# Patient Record
Sex: MALE | Race: WHITE | NOT HISPANIC OR LATINO
[De-identification: names, ages, dates, MRNs, and addresses within clinical notes are randomized per-mention and may not be internally consistent; named-entity substitution may affect disease eponyms.]

---

## 2017-05-18 ENCOUNTER — APPOINTMENT (OUTPATIENT)
Dept: UROLOGY | Facility: CLINIC | Age: 67
End: 2017-05-18

## 2017-11-02 ENCOUNTER — APPOINTMENT (OUTPATIENT)
Dept: UROLOGY | Facility: CLINIC | Age: 67
End: 2017-11-02

## 2017-11-09 ENCOUNTER — APPOINTMENT (OUTPATIENT)
Dept: UROLOGY | Facility: CLINIC | Age: 67
End: 2017-11-09
Payer: MEDICARE

## 2017-11-09 PROCEDURE — 99214 OFFICE O/P EST MOD 30 MIN: CPT

## 2017-11-10 LAB — PSA SERPL-MCNC: 3.35 NG/ML

## 2018-05-31 ENCOUNTER — APPOINTMENT (OUTPATIENT)
Dept: UROLOGY | Facility: CLINIC | Age: 68
End: 2018-05-31
Payer: MEDICARE

## 2018-05-31 VITALS
WEIGHT: 215 LBS | DIASTOLIC BLOOD PRESSURE: 80 MMHG | TEMPERATURE: 98.2 F | HEIGHT: 70 IN | OXYGEN SATURATION: 98 % | SYSTOLIC BLOOD PRESSURE: 130 MMHG | RESPIRATION RATE: 16 BRPM | HEART RATE: 84 BPM | BODY MASS INDEX: 30.78 KG/M2

## 2018-05-31 DIAGNOSIS — Z86.79 PERSONAL HISTORY OF OTHER DISEASES OF THE CIRCULATORY SYSTEM: ICD-10-CM

## 2018-05-31 PROCEDURE — 99214 OFFICE O/P EST MOD 30 MIN: CPT

## 2018-08-06 ENCOUNTER — FORM ENCOUNTER (OUTPATIENT)
Age: 68
End: 2018-08-06

## 2018-08-07 ENCOUNTER — OUTPATIENT (OUTPATIENT)
Dept: OUTPATIENT SERVICES | Facility: HOSPITAL | Age: 68
LOS: 1 days | End: 2018-08-07
Payer: MEDICARE

## 2018-08-07 ENCOUNTER — APPOINTMENT (OUTPATIENT)
Dept: MRI IMAGING | Facility: IMAGING CENTER | Age: 68
End: 2018-08-07
Payer: MEDICARE

## 2018-08-07 DIAGNOSIS — C61 MALIGNANT NEOPLASM OF PROSTATE: ICD-10-CM

## 2018-08-07 PROCEDURE — 82565 ASSAY OF CREATININE: CPT

## 2018-08-07 PROCEDURE — 72197 MRI PELVIS W/O & W/DYE: CPT

## 2018-08-07 PROCEDURE — 72197 MRI PELVIS W/O & W/DYE: CPT | Mod: 26

## 2018-08-07 PROCEDURE — A9585: CPT

## 2018-08-15 ENCOUNTER — MESSAGE (OUTPATIENT)
Age: 68
End: 2018-08-15

## 2018-10-29 ENCOUNTER — APPOINTMENT (OUTPATIENT)
Dept: UROLOGY | Facility: CLINIC | Age: 68
End: 2018-10-29
Payer: MEDICARE

## 2018-10-29 VITALS
OXYGEN SATURATION: 96 % | HEART RATE: 78 BPM | DIASTOLIC BLOOD PRESSURE: 92 MMHG | RESPIRATION RATE: 15 BRPM | HEIGHT: 70 IN | BODY MASS INDEX: 30.78 KG/M2 | TEMPERATURE: 98 F | WEIGHT: 215 LBS | SYSTOLIC BLOOD PRESSURE: 146 MMHG

## 2018-10-29 DIAGNOSIS — Z87.19 PERSONAL HISTORY OF OTHER DISEASES OF THE DIGESTIVE SYSTEM: ICD-10-CM

## 2018-10-29 PROCEDURE — 99214 OFFICE O/P EST MOD 30 MIN: CPT

## 2018-10-30 LAB — PSA SERPL-MCNC: 2 NG/ML

## 2018-12-20 ENCOUNTER — MEDICATION RENEWAL (OUTPATIENT)
Age: 68
End: 2018-12-20

## 2019-06-10 ENCOUNTER — APPOINTMENT (OUTPATIENT)
Dept: UROLOGY | Facility: CLINIC | Age: 69
End: 2019-06-10
Payer: MEDICARE

## 2019-06-10 VITALS
HEART RATE: 75 BPM | SYSTOLIC BLOOD PRESSURE: 140 MMHG | OXYGEN SATURATION: 98 % | HEIGHT: 70 IN | BODY MASS INDEX: 30.78 KG/M2 | WEIGHT: 215 LBS | DIASTOLIC BLOOD PRESSURE: 94 MMHG

## 2019-06-10 PROCEDURE — 99213 OFFICE O/P EST LOW 20 MIN: CPT

## 2019-06-10 NOTE — PHYSICAL EXAM
[General Appearance - Well Developed] : well developed [General Appearance - Well Nourished] : well nourished [Normal Appearance] : normal appearance [Well Groomed] : well groomed [General Appearance - In No Acute Distress] : no acute distress [Abdomen Soft] : soft [Abdomen Tenderness] : non-tender [Costovertebral Angle Tenderness] : no ~M costovertebral angle tenderness [Urethral Meatus] : meatus normal [Penis Abnormality] : normal circumcised penis [Urinary Bladder Findings] : the bladder was normal on palpation [Scrotum] : the scrotum was normal [Testes Tenderness] : no tenderness of the testes [Testes Mass (___cm)] : there were no testicular masses [Prostate Tenderness] : the prostate was not tender [No Prostate Nodules] : no prostate nodules [Prostate Enlarged] : was enlarged [FreeTextEntry1] : KIEL done on 10/2018 [Respiration, Rhythm And Depth] : normal respiratory rhythm and effort [] : no respiratory distress [Exaggerated Use Of Accessory Muscles For Inspiration] : no accessory muscle use

## 2019-06-10 NOTE — HISTORY OF PRESENT ILLNESS
[FreeTextEntry1] : He is a 68-year-old man who is seen today in follow-up for prostate cancer on observation and urinary symptoms. He is on finasteride, Flomax and daily Cialis. He seems to be satisfied with urination. Nocturia is 2 times. Cialis is working very well for erectile dysfunction. In October 2018, PSA level was 2. Residual urine was less than 30 cc today again. There is no hematuria, dysuria or flank pain. \par \par Previous notes: Pelvic MRI in August 2018 shows prostate volume 116 cc and no suspicious lesions. In August 2016, he was diagnosed with small volume [place] 6 prostate cancer, PSA of 5.6 and was placed on observation. MRI prior to the biopsy showed prostate volume 103 cc and a small area  with suspicion score of 3/5. In April 2016, CT scan showed small bilateral adrenal adenomas. He has undergone cystoscopy in the past.

## 2019-06-10 NOTE — ASSESSMENT
[FreeTextEntry1] : He can use Cialis up to 20 mg as needed. Continue Flomax and finasteride. PSA level will be checked. He should consider repeat prostate biopsy in the future. He he will think about it. Risks of prostate cancer on observation and also the effect of the finasteride on PSA level were discussed.\par \par Jet Short MD, FACS\par [facility]\par  of Urology\par \par [address], [address]\[place] [address]\par \par [address], [address]\[place] [address]\par \par Tel: [phone]\par Fax: [phone]

## 2019-06-11 LAB — PSA SERPL-MCNC: 1.68 NG/ML

## 2019-09-18 ENCOUNTER — APPOINTMENT (OUTPATIENT)
Dept: UROLOGY | Facility: CLINIC | Age: 69
End: 2019-09-18
Payer: MEDICARE

## 2019-09-18 VITALS
DIASTOLIC BLOOD PRESSURE: 80 MMHG | BODY MASS INDEX: 30.06 KG/M2 | SYSTOLIC BLOOD PRESSURE: 124 MMHG | OXYGEN SATURATION: 97 % | HEART RATE: 54 BPM | HEIGHT: 70 IN | RESPIRATION RATE: 14 BRPM | WEIGHT: 210 LBS

## 2019-09-18 PROCEDURE — 99213 OFFICE O/P EST LOW 20 MIN: CPT

## 2019-09-18 NOTE — ASSESSMENT
[FreeTextEntry1] : Continue Flomax finasteride. Urination is well-controlled. He could try Viagra again up to 100 mg instead of Cialis. When he returns from [place], he may consider repeat prostate biopsy or MRI, in 2020.

## 2019-09-18 NOTE — HISTORY OF PRESENT ILLNESS
[FreeTextEntry1] : He is a 69-year-old man who is seen today in follow-up for prostate cancer on observation, ED and urinary symptoms. He is on finasteride, Flomax and daily Cialis.  Cialis is not significantly helping with erections even up to 20 mg even though it was helpful in the past. He responded better to Viagra many years ago. Nocturia is 2 times. Urinary symptoms seem to be controlled. The residual urine today was minimal. PSA level was 1.68 in June 2019. He has no hematuria, dysuria or flank pain. \par \par Previous notes: Pelvic MRI in August 2018 shows prostate volume 116 cc and no suspicious lesions. In August 2016, he was diagnosed with small volume [place] 6 prostate cancer, PSA of 5.6 and was placed on observation. MRI prior to the biopsy showed prostate volume 103 cc and a small area  with suspicion score of 3/5. In April 2016, CT scan showed small bilateral adrenal adenomas. He has undergone cystoscopy in the past.

## 2019-09-18 NOTE — PHYSICAL EXAM
[Normal Appearance] : normal appearance [General Appearance - Well Developed] : well developed [General Appearance - Well Nourished] : well nourished [General Appearance - In No Acute Distress] : no acute distress [Well Groomed] : well groomed [Abdomen Soft] : soft [Abdomen Tenderness] : non-tender [Urethral Meatus] : meatus normal [Costovertebral Angle Tenderness] : no ~M costovertebral angle tenderness [Penis Abnormality] : normal circumcised penis [Scrotum] : the scrotum was normal [Urinary Bladder Findings] : the bladder was normal on palpation [Testes Tenderness] : no tenderness of the testes [Prostate Tenderness] : the prostate was not tender [Testes Mass (___cm)] : there were no testicular masses [No Prostate Nodules] : no prostate nodules [Prostate Enlarged] : was enlarged [FreeTextEntry1] : KIEL done on 10/2018 [] : no respiratory distress [Oriented To Time, Place, And Person] : oriented to person, place, and time [Mood] : the mood was normal [Affect] : the affect was normal [Not Anxious] : not anxious

## 2019-12-08 LAB — PSA SERPL-MCNC: 2.13 NG/ML

## 2020-02-14 ENCOUNTER — RX RENEWAL (OUTPATIENT)
Age: 70
End: 2020-02-14

## 2020-07-29 ENCOUNTER — APPOINTMENT (OUTPATIENT)
Dept: UROLOGY | Facility: CLINIC | Age: 70
End: 2020-07-29
Payer: MEDICARE

## 2020-07-29 VITALS
OXYGEN SATURATION: 95 % | HEIGHT: 70 IN | BODY MASS INDEX: 30.78 KG/M2 | DIASTOLIC BLOOD PRESSURE: 98 MMHG | SYSTOLIC BLOOD PRESSURE: 150 MMHG | HEART RATE: 75 BPM | TEMPERATURE: 97.6 F | WEIGHT: 215 LBS | RESPIRATION RATE: 14 BRPM

## 2020-07-29 PROCEDURE — 99214 OFFICE O/P EST MOD 30 MIN: CPT

## 2020-07-29 NOTE — HISTORY OF PRESENT ILLNESS
[FreeTextEntry1] : He is a 70-year-old man who is seen today in follow-up for prostate cancer on observation, ED and urinary symptoms. Recently he had painless gross hematuria which has slowly resolved. There was no flank pain or fever. He is a nonsmoker. He does not have previous history of hematuria. PSA level was 2.13 in December 2019. He is responding to Viagra 100 mg. There has been no significant side effects. Also he is on Flomax and Proscar. He has tried Cialis in the past without success. Nocturia is usually 2 times. Residual urine was minimal in the past.\par \par Previous notes: Pelvic MRI in August 2018 shows prostate volume 116 cc and no suspicious lesions. In August 2016, he was diagnosed with small volume [place] 6 prostate cancer, PSA of 5.6 and was placed on observation. MRI prior to the biopsy showed prostate volume 103 cc and a small area  with suspicion score of 3/5. In April 2016, CT scan showed small bilateral adrenal adenomas. He has undergone cystoscopy in the past.

## 2020-07-29 NOTE — ASSESSMENT
[FreeTextEntry1] : The protocol for prostate cancer on observation was discussed again. PSA level will be checked today. He will continue with Flomax and Proscar for his urinary symptoms. Continue Viagra and separate from Flomax by 4 hours. We discussed the difference between microscopic and gross hematuria. Potential benign and malignant urological conditions that can cause hematuria were reviewed. Workup including renal imaging with ultrasonography or CT scan, urine studies and cystoscopy were reviewed. Risks of cystoscopy were discussed. Patient was made aware that despite adequate workup, the cause for hematuria may not be discovered and continued followup is recommended. Patient's questions were answered. He will undergo CT scan and cystoscopy.

## 2020-07-29 NOTE — PHYSICAL EXAM
[General Appearance - Well Developed] : well developed [General Appearance - Well Nourished] : well nourished [Normal Appearance] : normal appearance [Well Groomed] : well groomed [General Appearance - In No Acute Distress] : no acute distress [Abdomen Soft] : soft [Abdomen Tenderness] : non-tender [Costovertebral Angle Tenderness] : no ~M costovertebral angle tenderness [Urethral Meatus] : meatus normal [Penis Abnormality] : normal circumcised penis [Urinary Bladder Findings] : the bladder was normal on palpation [Scrotum] : the scrotum was normal [Testes Tenderness] : no tenderness of the testes [Testes Mass (___cm)] : there were no testicular masses [Prostate Tenderness] : the prostate was not tender [No Prostate Nodules] : no prostate nodules [Prostate Enlarged] : was enlarged [FreeTextEntry1] : KIEL done on 7/2020 [] : no respiratory distress [Respiration, Rhythm And Depth] : normal respiratory rhythm and effort [Exaggerated Use Of Accessory Muscles For Inspiration] : no accessory muscle use [Oriented To Time, Place, And Person] : oriented to person, place, and time [Affect] : the affect was normal [Mood] : the mood was normal [Not Anxious] : not anxious [Inguinal Lymph Nodes Enlarged Bilaterally] : inguinal

## 2020-07-30 LAB
APPEARANCE: ABNORMAL
BACTERIA: NEGATIVE
BILIRUBIN URINE: ABNORMAL
BLOOD URINE: ABNORMAL
COLOR: ABNORMAL
GLUCOSE QUALITATIVE U: NEGATIVE
HYALINE CASTS: 2 /LPF
KETONES URINE: NEGATIVE
LEUKOCYTE ESTERASE URINE: NEGATIVE
MICROSCOPIC-UA: NORMAL
NITRITE URINE: NEGATIVE
PH URINE: 6.5
PROTEIN URINE: ABNORMAL
PSA SERPL-MCNC: 2.48 NG/ML
RED BLOOD CELLS URINE: >720 /HPF
SPECIFIC GRAVITY URINE: 1.02
SQUAMOUS EPITHELIAL CELLS: 1 /HPF
URINE CYTOLOGY: NORMAL
UROBILINOGEN URINE: NORMAL
WHITE BLOOD CELLS URINE: 11 /HPF

## 2020-07-31 LAB — BACTERIA UR CULT: NORMAL

## 2020-08-04 ENCOUNTER — APPOINTMENT (OUTPATIENT)
Dept: CT IMAGING | Facility: CLINIC | Age: 70
End: 2020-08-04
Payer: MEDICARE

## 2020-08-04 ENCOUNTER — OUTPATIENT (OUTPATIENT)
Dept: OUTPATIENT SERVICES | Facility: HOSPITAL | Age: 70
LOS: 1 days | End: 2020-08-04
Payer: MEDICARE

## 2020-08-04 ENCOUNTER — RESULT REVIEW (OUTPATIENT)
Age: 70
End: 2020-08-04

## 2020-08-04 DIAGNOSIS — R31.0 GROSS HEMATURIA: ICD-10-CM

## 2020-08-04 PROCEDURE — 74178 CT ABD&PLV WO CNTR FLWD CNTR: CPT

## 2020-08-04 PROCEDURE — 74178 CT ABD&PLV WO CNTR FLWD CNTR: CPT | Mod: 26

## 2020-08-04 PROCEDURE — 82565 ASSAY OF CREATININE: CPT

## 2020-08-12 ENCOUNTER — APPOINTMENT (OUTPATIENT)
Dept: UROLOGY | Facility: CLINIC | Age: 70
End: 2020-08-12

## 2020-08-24 ENCOUNTER — APPOINTMENT (OUTPATIENT)
Dept: UROLOGY | Facility: CLINIC | Age: 70
End: 2020-08-24
Payer: MEDICARE

## 2020-08-24 VITALS
TEMPERATURE: 98 F | HEIGHT: 70 IN | DIASTOLIC BLOOD PRESSURE: 92 MMHG | RESPIRATION RATE: 13 BRPM | BODY MASS INDEX: 30.78 KG/M2 | WEIGHT: 215 LBS | OXYGEN SATURATION: 93 % | SYSTOLIC BLOOD PRESSURE: 148 MMHG | HEART RATE: 79 BPM

## 2020-08-24 PROCEDURE — 52000 CYSTOURETHROSCOPY: CPT

## 2020-08-24 RX ORDER — MUPIROCIN 2 G/100G
2 CREAM TOPICAL
Qty: 15 | Refills: 0 | Status: ACTIVE | COMMUNITY
Start: 2020-08-04

## 2020-08-24 RX ORDER — BETAMETHASONE DIPROPIONATE 0.5 MG/G
0.05 OINTMENT TOPICAL
Qty: 45 | Refills: 0 | Status: ACTIVE | COMMUNITY
Start: 2020-06-09

## 2020-08-24 RX ORDER — METOPROLOL TARTRATE 50 MG/1
50 TABLET, FILM COATED ORAL
Qty: 180 | Refills: 0 | Status: ACTIVE | COMMUNITY
Start: 2020-06-18

## 2020-12-03 ENCOUNTER — APPOINTMENT (OUTPATIENT)
Dept: UROLOGY | Facility: CLINIC | Age: 70
End: 2020-12-03
Payer: MEDICARE

## 2020-12-03 VITALS
RESPIRATION RATE: 14 BRPM | TEMPERATURE: 97.8 F | WEIGHT: 220 LBS | SYSTOLIC BLOOD PRESSURE: 162 MMHG | HEIGHT: 70 IN | DIASTOLIC BLOOD PRESSURE: 98 MMHG | HEART RATE: 77 BPM | OXYGEN SATURATION: 94 % | BODY MASS INDEX: 31.5 KG/M2

## 2020-12-03 DIAGNOSIS — Z87.898 PERSONAL HISTORY OF OTHER SPECIFIED CONDITIONS: ICD-10-CM

## 2020-12-03 PROCEDURE — 99214 OFFICE O/P EST MOD 30 MIN: CPT

## 2020-12-03 PROCEDURE — 99072 ADDL SUPL MATRL&STAF TM PHE: CPT

## 2020-12-03 NOTE — HISTORY OF PRESENT ILLNESS
[FreeTextEntry1] : He is a 70-year-old man who is seen today in follow-up for prostate cancer on observation, ED and urinary symptoms.  He underwent hematuria work-up with cystoscopy, CT scan and urine studies in August 2020 which were normal.  There has been no further hematuria.  Now he is drinking tea before bedtime and therefore nocturia is 4 times.  He is on finasteride and Flomax.  Residual urine today was minimal.  He is not responding very well to Viagra 100 mg or Cialis.  PSA level was 2.48 in July 2020. He is a nonsmoker. \par \par Previous notes: Pelvic MRI in August 2018 shows prostate volume 116 cc and no suspicious lesions. In August 2016, he was diagnosed with small volume [place] 6 prostate cancer, PSA of 5.6 and was placed on observation. MRI prior to the biopsy showed prostate volume 103 cc and a small area  with suspicion score of 3/5. In April 2016, CT scan showed small bilateral adrenal adenomas. He has undergone cystoscopy in the past.

## 2020-12-03 NOTE — PHYSICAL EXAM
[General Appearance - Well Developed] : well developed [General Appearance - Well Nourished] : well nourished [Normal Appearance] : normal appearance [Well Groomed] : well groomed [General Appearance - In No Acute Distress] : no acute distress [Abdomen Soft] : soft [Abdomen Tenderness] : non-tender [Costovertebral Angle Tenderness] : no ~M costovertebral angle tenderness [Urethral Meatus] : meatus normal [Penis Abnormality] : normal circumcised penis [Urinary Bladder Findings] : the bladder was normal on palpation [Scrotum] : the scrotum was normal [Testes Tenderness] : no tenderness of the testes [Testes Mass (___cm)] : there were no testicular masses [Prostate Tenderness] : the prostate was not tender [No Prostate Nodules] : no prostate nodules [Prostate Enlarged] : was enlarged [FreeTextEntry1] : KIEL done on 7/2020. [] : no respiratory distress [Respiration, Rhythm And Depth] : normal respiratory rhythm and effort [Exaggerated Use Of Accessory Muscles For Inspiration] : no accessory muscle use [Oriented To Time, Place, And Person] : oriented to person, place, and time [Affect] : the affect was normal [Mood] : the mood was normal [Not Anxious] : not anxious [Inguinal Lymph Nodes Enlarged Bilaterally] : inguinal

## 2020-12-03 NOTE — ASSESSMENT
[FreeTextEntry1] : He should limit fluid intake before bedtime.  Previously, nocturia was better.  Continue Flomax and Proscar.  PSA level has decreased about 50% as expected with Proscar.  Hematuria work-up recently was unremarkable.  He has not responded to Viagra or Cialis.  Initially he was responding to them.  Another option would be to try Stendra 200 mg which was prescribed for him.  Separate Stendra from Flomax by about 4 hours.  Side effects reviewed.  He will follow up in a few months when he returns from [place].  Also the protocol for prostate cancer on observation was discussed.  Since PSA level has decreased, he has continued observation for now without having a repeat biopsy.\par \par Jet Short MD, FACS\par [facility]\par  of Urology\par \par [address], [address]\[place] [address]\par \par [address], [address]\[place] [address]\par \par Tel: [phone]\par Fax: [phone]

## 2021-05-20 ENCOUNTER — APPOINTMENT (OUTPATIENT)
Dept: UROLOGY | Facility: CLINIC | Age: 71
End: 2021-05-20
Payer: MEDICARE

## 2021-05-20 VITALS
HEART RATE: 73 BPM | TEMPERATURE: 97.4 F | OXYGEN SATURATION: 94 % | RESPIRATION RATE: 16 BRPM | WEIGHT: 220 LBS | DIASTOLIC BLOOD PRESSURE: 104 MMHG | HEIGHT: 70 IN | SYSTOLIC BLOOD PRESSURE: 145 MMHG | BODY MASS INDEX: 31.5 KG/M2

## 2021-05-20 DIAGNOSIS — Z87.448 PERSONAL HISTORY OF OTHER DISEASES OF URINARY SYSTEM: ICD-10-CM

## 2021-05-20 PROCEDURE — 99214 OFFICE O/P EST MOD 30 MIN: CPT

## 2021-05-20 PROCEDURE — 99072 ADDL SUPL MATRL&STAF TM PHE: CPT

## 2021-05-20 RX ORDER — AVANAFIL 200 MG/1
200 TABLET ORAL
Qty: 6 | Refills: 6 | Status: DISCONTINUED | COMMUNITY
Start: 2020-12-03 | End: 2021-05-20

## 2021-05-20 NOTE — PHYSICAL EXAM
[General Appearance - Well Developed] : well developed [General Appearance - Well Nourished] : well nourished [Normal Appearance] : normal appearance [Well Groomed] : well groomed [General Appearance - In No Acute Distress] : no acute distress [Abdomen Soft] : soft [Abdomen Tenderness] : non-tender [Costovertebral Angle Tenderness] : no ~M costovertebral angle tenderness [Urethral Meatus] : meatus normal [Penis Abnormality] : normal circumcised penis [Urinary Bladder Findings] : the bladder was normal on palpation [Scrotum] : the scrotum was normal [Testes Tenderness] : no tenderness of the testes [Testes Mass (___cm)] : there were no testicular masses [Prostate Tenderness] : the prostate was not tender [No Prostate Nodules] : no prostate nodules [Prostate Enlarged] : was enlarged [] : no respiratory distress [Respiration, Rhythm And Depth] : normal respiratory rhythm and effort [Exaggerated Use Of Accessory Muscles For Inspiration] : no accessory muscle use [Oriented To Time, Place, And Person] : oriented to person, place, and time [Affect] : the affect was normal [Mood] : the mood was normal [Not Anxious] : not anxious [FreeTextEntry1] : KIEL done on 5/2021.

## 2021-05-20 NOTE — ASSESSMENT
[FreeTextEntry1] : From urinary standpoint, he seems to be doing well on the current medication therapy.  Residual urine volume is minimal.  PSA level will be repeated.  He may decide on considering an MRI when he follows up in 6 months.  So far, he has held off on repeat biopsy due to low PSA level.  He is aware that PSA level decreased due to finasteride.  Also we discussed further management of erectile dysfunction with intracavernosal injection therapy such as Trimix.  He was instructed in the office on how to perform it.  Risk of priapism was discussed.  If he decides on using it, he will call me back to order it for him.

## 2021-05-20 NOTE — HISTORY OF PRESENT ILLNESS
[FreeTextEntry1] : He is a 70-year-old man who is seen today in follow-up for prostate cancer on observation, ED and urinary symptoms.  Nocturia is 2 times.  Urinary flow is normal.  He had worsening nocturia in the past when he was drinking tea before bedtime.  He is on tamsulosin and finasteride.  He did not respond very well to Viagra, Cialis or Stendra.  He is on Coumadin.  He is due for PSA level.  He underwent hematuria work-up with cystoscopy, CT scan and urine studies in August 2020 which were normal.  Residual urine was minimal. PSA level was 2.48 in July 2020. He is a nonsmoker. \par \par Previous notes: Pelvic MRI in August 2018 shows prostate volume 116 cc and no suspicious lesions. In August 2016, he was diagnosed with small volume [place] 6 prostate cancer, PSA of 5.6 and was placed on observation. MRI prior to the biopsy showed prostate volume 103 cc and a small area  with suspicion score of 3/5. In April 2016, CT scan showed small bilateral adrenal adenomas.

## 2021-05-21 ENCOUNTER — NON-APPOINTMENT (OUTPATIENT)
Age: 71
End: 2021-05-21

## 2021-05-21 LAB — PSA SERPL-MCNC: 2.31 NG/ML

## 2021-07-28 ENCOUNTER — RX RENEWAL (OUTPATIENT)
Age: 71
End: 2021-07-28

## 2021-10-19 ENCOUNTER — RX RENEWAL (OUTPATIENT)
Age: 71
End: 2021-10-19

## 2021-11-24 ENCOUNTER — APPOINTMENT (OUTPATIENT)
Dept: UROLOGY | Facility: CLINIC | Age: 71
End: 2021-11-24
Payer: MEDICARE

## 2021-11-24 VITALS
HEART RATE: 77 BPM | OXYGEN SATURATION: 94 % | TEMPERATURE: 98.1 F | HEIGHT: 70 IN | WEIGHT: 225 LBS | RESPIRATION RATE: 14 BRPM | SYSTOLIC BLOOD PRESSURE: 145 MMHG | BODY MASS INDEX: 32.21 KG/M2 | DIASTOLIC BLOOD PRESSURE: 103 MMHG

## 2021-11-24 PROCEDURE — 99213 OFFICE O/P EST LOW 20 MIN: CPT

## 2021-11-24 NOTE — HISTORY OF PRESENT ILLNESS
[FreeTextEntry1] : He is a 71 year-old man who is seen today in follow-up for prostate cancer on observation, ED and urinary symptoms.  Nocturia is 3-4 times but he drinks tea late at night.  Urinary flow is normal.  Residual urine today was 40 cc.  PSA level was 2.3 in May 2021.  He is on tamsulosin and finasteride.  He is considering MRI for next visit for evaluation of the prostate.  He did not respond very well to Viagra, Cialis or Stendra.  He is on Coumadin.  \par \par He underwent hematuria work-up with cystoscopy, CT scan and urine studies in August 2020 which were normal. He is a nonsmoker. \par \par Previous notes: Pelvic MRI in August 2018 shows prostate volume 116 cc and no suspicious lesions. In August 2016, he was diagnosed with small volume [place] 6 prostate cancer, PSA of 5.6 and was placed on observation. MRI prior to the biopsy showed prostate volume 103 cc and a small area  with suspicion score of 3/5. In April 2016, CT scan showed small bilateral adrenal adenomas.

## 2021-11-24 NOTE — ASSESSMENT
[FreeTextEntry1] : Prescriptions were refilled.  Side effects reviewed.  PSA level will be repeated today as well.  He may consider MRI of the prostate after the next visit.  He should limit intake of tea before bedtime.  Also other treatment options for erectile dysfunction have been discussed in the past.  He will follow up in 6 months.

## 2021-11-26 LAB — PSA SERPL-MCNC: 2.54 NG/ML

## 2022-05-12 ENCOUNTER — APPOINTMENT (OUTPATIENT)
Dept: UROLOGY | Facility: CLINIC | Age: 72
End: 2022-05-12
Payer: MEDICARE

## 2022-05-12 PROCEDURE — 99213 OFFICE O/P EST LOW 20 MIN: CPT

## 2022-05-12 NOTE — PHYSICAL EXAM
[Urethral Meatus] : meatus normal [Penis Abnormality] : normal circumcised penis [Urinary Bladder Findings] : the bladder was normal on palpation [Scrotum] : the scrotum was normal [Testes Tenderness] : no tenderness of the testes [Testes Mass (___cm)] : there were no testicular masses [Prostate Tenderness] : the prostate was not tender [No Prostate Nodules] : no prostate nodules [Prostate Enlarged] : was enlarged [General Appearance - Well Developed] : well developed [General Appearance - Well Nourished] : well nourished [Normal Appearance] : normal appearance [Well Groomed] : well groomed [General Appearance - In No Acute Distress] : no acute distress [Abdomen Soft] : soft [Abdomen Tenderness] : non-tender [Costovertebral Angle Tenderness] : no ~M costovertebral angle tenderness [] : no respiratory distress [Respiration, Rhythm And Depth] : normal respiratory rhythm and effort [Exaggerated Use Of Accessory Muscles For Inspiration] : no accessory muscle use

## 2022-05-12 NOTE — HISTORY OF PRESENT ILLNESS
[FreeTextEntry1] : He is a 71 year-old man who is seen today in follow-up for prostate cancer on observation, ED and urinary symptoms.  He still has nocturia 3-4 times but he does not limit fluids and tea drinking before bedtime.  Residual urine volume today was minimal.  PSA level was stable which was 2.5 in November 2021.  Continued observation given stability of PSA level.  He is on finasteride and tamsulosin.  He did not respond very well to Viagra, Cialis or Stendra.  He is on Coumadin.  \par \par He underwent hematuria work-up with cystoscopy, CT scan and urine studies in August 2020 which were normal. He is a nonsmoker. \par \par Previous notes: Pelvic MRI in August 2018 shows prostate volume 116 cc and no suspicious lesions. In August 2016, he was diagnosed with small volume [place] 6 prostate cancer, PSA of 5.6 and was placed on observation. MRI prior to the biopsy showed prostate volume 103 cc and a small area  with suspicion score of 3/5. In April 2016, CT scan showed small bilateral adrenal adenomas.

## 2022-05-12 NOTE — ASSESSMENT
[FreeTextEntry1] : He is emptying his bladder well.  He will continue with tamsulosin and finasteride.  PSA level will be repeated.  He has continued observation for now.  He may consider repeat MRI later this year.  Try to limit fluids before bedtime.  He will follow-up in 6 months pending results.

## 2022-05-13 LAB — PSA SERPL-MCNC: 3.79 NG/ML

## 2022-06-16 ENCOUNTER — OUTPATIENT (OUTPATIENT)
Dept: OUTPATIENT SERVICES | Facility: HOSPITAL | Age: 72
LOS: 1 days | End: 2022-06-16
Payer: MEDICARE

## 2022-06-16 ENCOUNTER — APPOINTMENT (OUTPATIENT)
Dept: MRI IMAGING | Facility: CLINIC | Age: 72
End: 2022-06-16
Payer: MEDICARE

## 2022-06-16 ENCOUNTER — RESULT REVIEW (OUTPATIENT)
Age: 72
End: 2022-06-16

## 2022-06-16 DIAGNOSIS — C61 MALIGNANT NEOPLASM OF PROSTATE: ICD-10-CM

## 2022-06-16 PROCEDURE — 76498 UNLISTED MR PROCEDURE: CPT

## 2022-06-16 PROCEDURE — 72197 MRI PELVIS W/O & W/DYE: CPT

## 2022-06-16 PROCEDURE — 76498P: CUSTOM | Mod: 26

## 2022-06-16 PROCEDURE — 72197 MRI PELVIS W/O & W/DYE: CPT | Mod: 26

## 2022-06-16 PROCEDURE — A9585: CPT

## 2022-08-15 ENCOUNTER — RX RENEWAL (OUTPATIENT)
Age: 72
End: 2022-08-15

## 2022-08-31 ENCOUNTER — APPOINTMENT (OUTPATIENT)
Dept: ORTHOPEDIC SURGERY | Facility: CLINIC | Age: 72
End: 2022-08-31

## 2022-08-31 VITALS — BODY MASS INDEX: 32.93 KG/M2 | WEIGHT: 230 LBS | HEIGHT: 70 IN

## 2022-08-31 PROCEDURE — 73564 X-RAY EXAM KNEE 4 OR MORE: CPT | Mod: RT

## 2022-08-31 PROCEDURE — 99203 OFFICE O/P NEW LOW 30 MIN: CPT

## 2022-08-31 RX ORDER — MELOXICAM 7.5 MG/1
7.5 TABLET ORAL TWICE DAILY
Qty: 30 | Refills: 0 | Status: ACTIVE | COMMUNITY
Start: 2022-08-31 | End: 1900-01-01

## 2022-08-31 NOTE — PHYSICAL EXAM
[de-identified] : General Exam\par \par Well developed, well nourished\par No apparent distress\par Oriented to person, place, and time\par Mood: Normal\par Affect: Normal\par Balance and coordination: Normal\par Gait: Normal\par \par right knee exam\par \par Skin: Clean, dry, intact\par Inspection: No obvious malalignment, no masses, no swelling, + effusion.\par Tenderness: + MJLT. No LJLT. No tenderness over the medial and lateral patella facets. No ttp medial/lateral epicondyle, patella tendon, tibial tubercle, pes anserinus, or proximal fibula.\par ROM: 0 to 130° no pain with deep flexion in both knees\par Stability: Stable to varus, valgus, lachman testing. Negative anterior/posterior drawer.\par Additional tests: Negative McMurrays test, Negative patellar grind test. \par Strength: 5/5 Q/H/TA/GS/EHL, no atrophy\par Neuro: In tact to light touch throughout in dp/sp/tib/lexi/saph nerve districutions, DTR's normal\par Pulses: 2+ DP/PT pulses.\par  [de-identified] : \par The following radiographs were ordered and read by me during this patients visit. I reviewed each radiograph in detail with the patient and discussed the findings as highlighted below. \par \par 4 views right knee were obtained today.  There is moderate to severe patellofemoral arthrosis joint space narrowing osteophyte sclerosis

## 2022-08-31 NOTE — HISTORY OF PRESENT ILLNESS
[de-identified] : 71yo male presents complaining of right knee pain for 2 weeks.  He states this started when he was golfing.  He perhaps twisted his knee in an awkward position.  He states he developed some mild swelling.  He has pain medial aspect.  Worse with weightbearing.  Overall slightly improving.  He states he has never had any issues in the past.  Denies numbness tingling\par \par The patient's past medical history, past surgical history, medications, allergies, and social history were reviewed by me today with the patient and documented accordingly. In addition, the patient's family history, which is noncontributory to this visit, was also reviewed.\par

## 2022-08-31 NOTE — DISCUSSION/SUMMARY
[de-identified] : I discussed the treatment of degenerative arthritis with the patient at length today. I described the spectrum of treatment from nonoperative modalities to total joint arthroplasty. Noninvasive and nonoperative treatment modalities include weight reduction, activity modification with low impact exercise, PRN use of acetaminophen or anti-inflammatory medication if tolerated, glucosamine/chondroitin supplements, and physical therapy. Further treatments can include corticosteroid injection and the use of hyaluronic acid injections. Definitive treatment can certainly include total joint arthroplasty also. The risks and benefits of each treatment options was discussed and all questions were answered.  Given prescription for low-dose Mobic side effects discussed.  We did discuss interaction with Coumadin to be aware for easy bruising and to let us know immediately stop the medication if that occurs.  We will do a trial of 2 weeks of low-dose Mobic.  If not improved we will consider aspiration and corticosteroid injection at that time which she declined today.  All questions were answered

## 2022-09-26 ENCOUNTER — RX RENEWAL (OUTPATIENT)
Age: 72
End: 2022-09-26

## 2022-10-27 ENCOUNTER — APPOINTMENT (OUTPATIENT)
Dept: UROLOGY | Facility: CLINIC | Age: 72
End: 2022-10-27

## 2022-10-27 PROCEDURE — 99214 OFFICE O/P EST MOD 30 MIN: CPT

## 2022-10-27 RX ORDER — FLUOROURACIL 50 MG/G
5 CREAM TOPICAL
Qty: 40 | Refills: 0 | Status: ACTIVE | COMMUNITY
Start: 2022-09-13

## 2022-10-27 RX ORDER — RAMIPRIL 10 MG/1
10 CAPSULE ORAL
Qty: 180 | Refills: 0 | Status: ACTIVE | COMMUNITY
Start: 2022-03-07

## 2022-10-27 RX ORDER — AMLODIPINE BESYLATE 5 MG/1
5 TABLET ORAL
Qty: 90 | Refills: 0 | Status: ACTIVE | COMMUNITY
Start: 2022-06-01

## 2022-10-27 RX ORDER — TRIAMCINOLONE ACETONIDE 1 MG/G
0.1 CREAM TOPICAL
Qty: 80 | Refills: 0 | Status: ACTIVE | COMMUNITY
Start: 2022-07-19

## 2022-10-27 RX ORDER — KETOCONAZOLE 20.5 MG/ML
2 SHAMPOO, SUSPENSION TOPICAL
Qty: 120 | Refills: 0 | Status: ACTIVE | COMMUNITY
Start: 2022-09-13

## 2022-10-27 RX ORDER — HYDROCORTISONE 25 MG/G
2.5 OINTMENT TOPICAL
Qty: 20 | Refills: 0 | Status: ACTIVE | COMMUNITY
Start: 2022-09-28

## 2022-10-27 RX ORDER — BENZONATATE 200 MG/1
200 CAPSULE ORAL
Qty: 30 | Refills: 0 | Status: ACTIVE | COMMUNITY
Start: 2022-06-28

## 2022-10-27 RX ORDER — CEFADROXIL 500 MG/1
500 CAPSULE ORAL
Qty: 20 | Refills: 0 | Status: ACTIVE | COMMUNITY
Start: 2022-07-19

## 2022-10-27 NOTE — HISTORY OF PRESENT ILLNESS
[FreeTextEntry1] : He is a 72 year-old man who is seen today in follow-up for prostate cancer on active surveillance, ED and urinary symptoms.  Urinary symptoms remain unchanged with nocturia 3 times.  He does not necessarily limit fluids before bedtime.  Residual urine volume today was about 200 mL.  PSA level increased to 3.79 in May 2022.  MRI of the prostate in June 2022 showed 133 g prostate and no MRI suspicious lesions.  He has not undergone prostate biopsy in a while since PSA level was relatively stable before and in view of MRI findings.  He is on tamsulosin and finasteride.  He has used sildenafil 100 mg in the past for erectile dysfunction.  He has also used tadalafil and Stendra.  He is on anticoagulation therapy.\par \par He underwent hematuria work-up with cystoscopy, CT scan and urine studies in August 2020 which were normal. He is a nonsmoker. \par \par Previous notes: Pelvic MRI in August 2018 shows prostate volume 116 cc and no suspicious lesions. In August 2016, he was diagnosed with small volume [place] 6 prostate cancer, PSA of 5.6 and was placed on observation. MRI prior to the biopsy showed prostate volume 103 cc and a small area  with suspicion score of 3/5. In April 2016, CT scan showed small bilateral adrenal adenomas.

## 2022-10-27 NOTE — PHYSICAL EXAM
[General Appearance - Well Developed] : well developed [General Appearance - Well Nourished] : well nourished [Normal Appearance] : normal appearance [Well Groomed] : well groomed [General Appearance - In No Acute Distress] : no acute distress [Abdomen Soft] : soft [Abdomen Tenderness] : non-tender [Costovertebral Angle Tenderness] : no ~M costovertebral angle tenderness [Urethral Meatus] : meatus normal [Penis Abnormality] : normal circumcised penis [Urinary Bladder Findings] : the bladder was normal on palpation [Scrotum] : the scrotum was normal [Testes Tenderness] : no tenderness of the testes [Testes Mass (___cm)] : there were no testicular masses [Prostate Tenderness] : the prostate was not tender [No Prostate Nodules] : no prostate nodules [Prostate Enlarged] : was enlarged [FreeTextEntry1] : Prostate examined in May 2022 [] : no respiratory distress [Respiration, Rhythm And Depth] : normal respiratory rhythm and effort [Exaggerated Use Of Accessory Muscles For Inspiration] : no accessory muscle use [Oriented To Time, Place, And Person] : oriented to person, place, and time [Affect] : the affect was normal [Mood] : the mood was normal [Not Anxious] : not anxious

## 2022-10-27 NOTE — ASSESSMENT
[FreeTextEntry1] : He does not have any new urinary symptoms but the residual urine volume was elevated.  He will continue to monitor.  He will continue with tamsulosin and finasteride which were refilled.  He has not responded very well to oral medication therapy for prostate cancer.  PSA level will be checked.  He is aware that finasteride reduces PSA by 50%.  Recent MRI did not show any obvious lesions.  False-negative rate of MRI was discussed.  For now he will continue to monitor with PSA levels.

## 2022-10-28 ENCOUNTER — NON-APPOINTMENT (OUTPATIENT)
Age: 72
End: 2022-10-28

## 2022-10-28 LAB — PSA SERPL-MCNC: 2.97 NG/ML

## 2022-11-23 ENCOUNTER — APPOINTMENT (OUTPATIENT)
Dept: ORTHOPEDIC SURGERY | Facility: CLINIC | Age: 72
End: 2022-11-23

## 2022-11-23 PROCEDURE — 99214 OFFICE O/P EST MOD 30 MIN: CPT | Mod: 25

## 2022-11-23 PROCEDURE — 20610 DRAIN/INJ JOINT/BURSA W/O US: CPT | Mod: RT

## 2022-11-23 NOTE — PHYSICAL EXAM
[de-identified] : right knee exam\par \par Skin: Clean, dry, intact\par Inspection: No obvious malalignment, no masses, no swelling, no effusion.\par Tenderness: + MJLT. No LJLT. No tenderness over the medial and lateral patella facets. No ttp medial/lateral epicondyle, patella tendon, tibial tubercle, pes anserinus, or proximal fibula.\par ROM: 0 to 130° no pain with deep flexion in both knees\par Stability: Stable to varus, valgus, lachman testing. Negative anterior/posterior drawer.\par Additional tests: Negative McMurrays test, Negative patellar grind test. \par Strength: 5/5 Q/H/TA/GS/EHL, no atrophy\par Neuro: In tact to light touch throughout in dp/sp/tib/lexi/saph nerve districutions, DTR's normal\par Pulses: 2+ DP/PT pulses.\par

## 2022-11-23 NOTE — HISTORY OF PRESENT ILLNESS
[de-identified] : 71yo male presents for follow-up right knee pain.  He was last seen in August was prescribed Mobic which she is taking intermittently.  He was recently in an airport flying back to [place] from [place] he may have aggravated his knee by walking a lot.  He reports intermittent pain anterior medial aspect of his knee.  Reports intermittent swelling.  He is interested in cortisone injection.  He complains of acute worsening of his pain

## 2022-11-23 NOTE — DISCUSSION/SUMMARY
[de-identified] : Right knee osteoarthritis.  I discussed the treatment of degenerative arthritis with the patient at length today. I described the spectrum of treatment from nonoperative modalities to total joint arthroplasty. Noninvasive and nonoperative treatment modalities include weight reduction, activity modification with low impact exercise, PRN use of acetaminophen or anti-inflammatory medication if tolerated, glucosamine/chondroitin supplements, and physical therapy. Further treatments can include corticosteroid injection and the use of hyaluronic acid injections. Definitive treatment can certainly include total joint arthroplasty also. The risks and benefits of each treatment options was discussed and all questions were answered.\par \par Injection: Right knee joint.\par Indication: Arthritis.\par \par A discussion was had with the patient regarding this procedure and all questions were answered. All risks, benefits and alternatives were discussed. These included but were not limited to bleeding, infection, and allergic reaction. Alcohol was used to clean the skin, and betadine was used to sterilize and prep the area in the supero-lateral aspect of the right knee. Ethyl chloride spray was then used as a topical anesthetic. A 21-gauge needle was used to inject 4cc of 1% lidocaine and 1cc of 40mg/ml methylprednisolone into the knee. A sterile bandage was then applied. The patient tolerated the procedure well and there were no complications. \par \par All questions answered follow-up as needed

## 2023-06-13 ENCOUNTER — APPOINTMENT (OUTPATIENT)
Dept: ORTHOPEDIC SURGERY | Facility: CLINIC | Age: 73
End: 2023-06-13
Payer: MEDICARE

## 2023-06-13 VITALS — WEIGHT: 238 LBS | HEIGHT: 70 IN | BODY MASS INDEX: 34.07 KG/M2

## 2023-06-13 DIAGNOSIS — M17.11 UNILATERAL PRIMARY OSTEOARTHRITIS, RIGHT KNEE: ICD-10-CM

## 2023-06-13 PROCEDURE — 99214 OFFICE O/P EST MOD 30 MIN: CPT | Mod: 25

## 2023-06-13 PROCEDURE — 20610 DRAIN/INJ JOINT/BURSA W/O US: CPT | Mod: RT

## 2023-07-24 ENCOUNTER — RX RENEWAL (OUTPATIENT)
Age: 73
End: 2023-07-24

## 2023-07-27 ENCOUNTER — APPOINTMENT (OUTPATIENT)
Dept: UROLOGY | Facility: CLINIC | Age: 73
End: 2023-07-27
Payer: MEDICARE

## 2023-07-27 PROCEDURE — 99214 OFFICE O/P EST MOD 30 MIN: CPT

## 2023-07-27 NOTE — PHYSICAL EXAM
[Urethral Meatus] : meatus normal [Penis Abnormality] : normal circumcised penis [Urinary Bladder Findings] : the bladder was normal on palpation [Scrotum] : the scrotum was normal [Testes Tenderness] : no tenderness of the testes [Testes Mass (___cm)] : there were no testicular masses [Prostate Tenderness] : the prostate was not tender [No Prostate Nodules] : no prostate nodules [Prostate Enlarged] : was enlarged [General Appearance - Well Developed] : well developed [General Appearance - Well Nourished] : well nourished [Normal Appearance] : normal appearance [Well Groomed] : well groomed [General Appearance - In No Acute Distress] : no acute distress [Abdomen Soft] : soft [Abdomen Tenderness] : non-tender [Costovertebral Angle Tenderness] : no ~M costovertebral angle tenderness [FreeTextEntry1] : Prostate examined in 2022 [] : no respiratory distress [Respiration, Rhythm And Depth] : normal respiratory rhythm and effort [Exaggerated Use Of Accessory Muscles For Inspiration] : no accessory muscle use

## 2023-07-27 NOTE — HISTORY OF PRESENT ILLNESS
[FreeTextEntry1] : He is a 73 year-old man who is seen today in follow-up for prostate cancer on active surveillance, ED and large prostate and urinary symptoms.  So far, he has decided not to proceed with repeat prostate biopsy.  Nocturia is 3 times.  He is on tamsulosin and finasteride.  There is no hematuria or dysuria.  Residual urine volume today was improved, about 60 mL.  In October 2022 PSA was 2.97 and in July 2023 was 3.9 by his primary care physician, according to patient.  He has not used sildenafil recently.  He has used Stendra and tadalafil in the past.\par \par MRI of the prostate in June 2022 showed 133 g prostate and no MRI suspicious lesions. He underwent hematuria work-up with cystoscopy, CT scan and urine studies in August 2020 which were normal. He is a nonsmoker. \par \par Previous notes: Pelvic MRI in August 2018 shows prostate volume 116 cc and no suspicious lesions. In August 2016, he was diagnosed with small volume Hipolito 6 prostate cancer, PSA of 5.6 and was placed on observation. MRI prior to the biopsy showed prostate volume 103 cc and a small area  with suspicion score of 3/5. In April 2016, CT scan showed small bilateral adrenal adenomas.

## 2023-07-27 NOTE — ASSESSMENT
[FreeTextEntry1] : And PSA levels have fluctuated.  He is aware that finasteride reduces PSA level by 50%.  He is aware that MRI of the prostate should not replace repeat prostate biopsies.  He wants to continue monitoring.  He will continue with tamsulosin and finasteride.  He has not had a chance to use sildenafil recently.  Residual urine volume has actually improved.  Also surgical treatment options for enlarged prostate were discussed.  He can follow-up in 6 months to 1 year since PSA level was also done by his primary care physician.

## 2023-09-25 ENCOUNTER — RX RENEWAL (OUTPATIENT)
Age: 73
End: 2023-09-25

## 2023-09-25 RX ORDER — SILDENAFIL 100 MG/1
100 TABLET, FILM COATED ORAL
Qty: 18 | Refills: 3 | Status: ACTIVE | COMMUNITY
Start: 2019-09-18 | End: 1900-01-01

## 2023-10-23 ENCOUNTER — RX RENEWAL (OUTPATIENT)
Age: 73
End: 2023-10-23

## 2024-06-03 ENCOUNTER — APPOINTMENT (OUTPATIENT)
Dept: UROLOGY | Facility: CLINIC | Age: 74
End: 2024-06-03
Payer: MEDICARE

## 2024-06-03 VITALS
OXYGEN SATURATION: 93 % | HEIGHT: 70 IN | DIASTOLIC BLOOD PRESSURE: 90 MMHG | BODY MASS INDEX: 34.07 KG/M2 | RESPIRATION RATE: 16 BRPM | TEMPERATURE: 98 F | WEIGHT: 238 LBS | HEART RATE: 76 BPM | SYSTOLIC BLOOD PRESSURE: 138 MMHG

## 2024-06-03 DIAGNOSIS — N52.9 MALE ERECTILE DYSFUNCTION, UNSPECIFIED: ICD-10-CM

## 2024-06-03 DIAGNOSIS — R35.1 NOCTURIA: ICD-10-CM

## 2024-06-03 PROCEDURE — G2211 COMPLEX E/M VISIT ADD ON: CPT

## 2024-06-03 PROCEDURE — 99214 OFFICE O/P EST MOD 30 MIN: CPT

## 2024-06-03 NOTE — ASSESSMENT
[FreeTextEntry1] : His exam is unchanged.  He will continue with tamsulosin and finasteride.  He is aware that finasteride reduces PSA level by about 50%.  Residual urine volume today was minimal.  Pending PSA results from today, may consider repeat MRI and potentially repeat biopsy if he agrees to proceed.  He should try sildenafil on empty stomach for better results.

## 2024-06-03 NOTE — HISTORY OF PRESENT ILLNESS
[FreeTextEntry1] : He is a 73 year-old man who is seen today in follow-up for prostate cancer on active surveillance, ED, large prostate and urinary symptoms.  Nocturia is 2 or 3 times.  He is on tamsulosin and finasteride.  He has intermittent response to sildenafil 100 mg.  He is repeating PSA level today.  So far, he has decided not to proceed with repeat prostate biopsy.  Residual urine volume today was minimal.  In October 2022 PSA was 2.97 and in July 2023 was 3.9 by his primary care physician, according to patient.  He has used Stendra and tadalafil in the past.  Previous notes: MRI of the prostate in June 2022 showed 133 g prostate and no MRI suspicious lesions. He underwent hematuria work-up with cystoscopy, CT scan and urine studies in August 2020 which were normal. He is a nonsmoker.   Pelvic MRI in August 2018 shows prostate volume 116 cc and no suspicious lesions. In August 2016, he was diagnosed with small volume Hipolito 6 prostate cancer, PSA of 5.6 and was placed on observation. MRI prior to the biopsy showed prostate volume 103 cc and a small area  with suspicion score of 3/5. In April 2016, CT scan showed small bilateral adrenal adenomas.

## 2024-06-03 NOTE — PHYSICAL EXAM
[Urethral Meatus] : meatus normal [Penis Abnormality] : normal circumcised penis [Urinary Bladder Findings] : the bladder was normal on palpation [Scrotum] : the scrotum was normal [Testes Tenderness] : no tenderness of the testes [Testes Mass (___cm)] : there were no testicular masses [Prostate Tenderness] : the prostate was not tender [No Prostate Nodules] : no prostate nodules [Prostate Enlarged] : was enlarged [FreeTextEntry1] : Genital exam was done previously [General Appearance - Well Developed] : well developed [General Appearance - Well Nourished] : well nourished [Normal Appearance] : normal appearance [Well Groomed] : well groomed [] : no respiratory distress [Exaggerated Use Of Accessory Muscles For Inspiration] : no accessory muscle use [Abdomen Soft] : soft [Abdomen Tenderness] : non-tender [Oriented To Time, Place, And Person] : oriented to person, place, and time [Affect] : the affect was normal [Mood] : the mood was normal [Not Anxious] : not anxious

## 2024-06-04 LAB — PSA SERPL-MCNC: 2.94 NG/ML

## 2024-06-18 ENCOUNTER — RESULT REVIEW (OUTPATIENT)
Age: 74
End: 2024-06-18

## 2024-06-18 ENCOUNTER — OUTPATIENT (OUTPATIENT)
Dept: OUTPATIENT SERVICES | Facility: HOSPITAL | Age: 74
LOS: 1 days | End: 2024-06-18
Payer: MEDICARE

## 2024-06-18 ENCOUNTER — APPOINTMENT (OUTPATIENT)
Dept: MRI IMAGING | Facility: CLINIC | Age: 74
End: 2024-06-18

## 2024-06-18 DIAGNOSIS — C61 MALIGNANT NEOPLASM OF PROSTATE: ICD-10-CM

## 2024-06-18 PROCEDURE — 72197 MRI PELVIS W/O & W/DYE: CPT | Mod: 26

## 2024-06-18 PROCEDURE — 76498 UNLISTED MR PROCEDURE: CPT

## 2024-06-18 PROCEDURE — 72197 MRI PELVIS W/O & W/DYE: CPT

## 2024-06-18 PROCEDURE — A9585: CPT

## 2024-06-18 PROCEDURE — 76498P: CUSTOM | Mod: 26

## 2024-06-23 DIAGNOSIS — C61 MALIGNANT NEOPLASM OF PROSTATE: ICD-10-CM

## 2024-07-01 ENCOUNTER — NON-APPOINTMENT (OUTPATIENT)
Age: 74
End: 2024-07-01

## 2024-07-02 ENCOUNTER — APPOINTMENT (OUTPATIENT)
Dept: ORTHOPEDIC SURGERY | Facility: CLINIC | Age: 74
End: 2024-07-02
Payer: MEDICARE

## 2024-07-02 VITALS — HEIGHT: 70 IN | BODY MASS INDEX: 34.36 KG/M2 | WEIGHT: 240 LBS

## 2024-07-02 DIAGNOSIS — M76.821 POSTERIOR TIBIAL TENDINITIS, RIGHT LEG: ICD-10-CM

## 2024-07-02 PROCEDURE — 99203 OFFICE O/P NEW LOW 30 MIN: CPT

## 2024-07-02 RX ORDER — DICLOFENAC SODIUM 1% 10 MG/G
1 GEL TOPICAL
Qty: 1 | Refills: 1 | Status: ACTIVE | COMMUNITY
Start: 2024-07-02 | End: 1900-01-01

## 2024-07-09 ENCOUNTER — OUTPATIENT (OUTPATIENT)
Dept: OUTPATIENT SERVICES | Facility: HOSPITAL | Age: 74
LOS: 1 days | End: 2024-07-09

## 2024-07-09 VITALS
OXYGEN SATURATION: 97 % | DIASTOLIC BLOOD PRESSURE: 84 MMHG | SYSTOLIC BLOOD PRESSURE: 122 MMHG | WEIGHT: 242.07 LBS | HEART RATE: 60 BPM | RESPIRATION RATE: 16 BRPM | HEIGHT: 69 IN | TEMPERATURE: 98 F

## 2024-07-09 DIAGNOSIS — I48.91 UNSPECIFIED ATRIAL FIBRILLATION: ICD-10-CM

## 2024-07-09 DIAGNOSIS — I10 ESSENTIAL (PRIMARY) HYPERTENSION: ICD-10-CM

## 2024-07-09 DIAGNOSIS — S02.85XA FRACTURE OF ORBIT, UNSPECIFIED, INITIAL ENCOUNTER FOR CLOSED FRACTURE: Chronic | ICD-10-CM

## 2024-07-09 DIAGNOSIS — Z91.89 OTHER SPECIFIED PERSONAL RISK FACTORS, NOT ELSEWHERE CLASSIFIED: ICD-10-CM

## 2024-07-09 DIAGNOSIS — C61 MALIGNANT NEOPLASM OF PROSTATE: ICD-10-CM

## 2024-07-09 DIAGNOSIS — Z98.890 OTHER SPECIFIED POSTPROCEDURAL STATES: Chronic | ICD-10-CM

## 2024-07-09 RX ORDER — RAMIPRIL 10 MG/1
1 CAPSULE ORAL
Refills: 0 | DISCHARGE

## 2024-07-09 NOTE — H&P PST ADULT - PROBLEM SELECTOR PLAN 1
Patient tentatively scheduled for transperineal fusion prostate biopsy on 07/16/2024.  Pre-op instructions provided. Pt given verbal and written instructions with teach back on chlorhexidine wash  and pepcid. Pt verbalized understanding with return demonstration.  Urine culture -done  cbc done by cardiologist - will request

## 2024-07-09 NOTE — H&P PST ADULT - FUNCTIONAL STATUS
Mets Dasi score 7.04 , Carries groceries ,Golf twice a week , Walks 1 to 2 blocks, climbs 1 flight of stairs, ADLs/4-10 METS

## 2024-07-09 NOTE — H&P PST ADULT - CARDIOVASCULAR COMMENTS
hx of HTN, Afib on Eliquis - follows cardiologist hx of HTN, Afib on Eliquis ( used to be on warfarin now switched to Eliquis since last month)- follows cardiologist

## 2024-07-09 NOTE — H&P PST ADULT - NEGATIVE NEUROLOGICAL SYMPTOMS
How Severe Is Your Skin Lesion?: mild Has Your Skin Lesion Been Treated?: not been treated Is This A New Presentation, Or A Follow-Up?: Skin Lesion Additional History: Melanoma was on Upper back and right Axilla, removed in 2015 and 2008 no transient paralysis/no weakness/no paresthesias

## 2024-07-09 NOTE — H&P PST ADULT - NSICDXPASTMEDICALHX_GEN_ALL_CORE_FT
PAST MEDICAL HISTORY:  Afib     H/O erectile dysfunction     History of BPH     Malignant neoplasm of prostate

## 2024-07-09 NOTE — H&P PST ADULT - ANESTHESIA, PREVIOUS REACTION, PROFILE
[facility] [place] [facility]ensive [facility]  Pulmonology  Progress Note    Patient Name: Abelardo Irene Jr.  MRN: [id number]  Admission Date: 3/6/2023  Hospital Length of Stay: 1 days  Code Status: Full Code  Attending Provider: Romeo Lopez MD  Primary Care Provider: Rolando Funes MD   Principal Problem: Acute respiratory failure with hypoxia and hypercarbia    Subjective:     Interval History: Failed SBT today with periods of apnea. Still with diffuse wheezing. Net negative 1.5L.     Objective:     Vital Signs (Most Recent):  Temp: 98.1 °F (36.7 °C) (03/07/23 1340)  Pulse: 85 (03/07/23 1340)  Resp: 11 (03/07/23 1340)  BP: (!) 147/85 (03/07/23 1100)  SpO2: (!) 92 % (03/07/23 1340)   Vital Signs (24h Range):  Temp:  [96.4 °F (35.8 °C)-99.5 °F (37.5 °C)] 98.1 °F (36.7 °C)  Pulse:  [] 85  Resp:  [10-27] 11  SpO2:  [92 %-99 %] 92 %  BP: (104-147)/(51-85) 147/85     Weight: 86.3 kg (190 lb 4.1 oz)  Body mass index is 27.3 kg/m².      Intake/Output Summary (Last 24 hours) at 3/7/2023 1510  Last data filed at 3/7/2023 0305  Gross per 24 hour   Intake 407.97 ml   Output 1060 ml   Net -652.03 ml       Physical Exam  Constitutional:       Appearance: He is ill-appearing. He is not diaphoretic.      Interventions: He is sedated, intubated and restrained.   HENT:      Head: Normocephalic and atraumatic.   Eyes:      General:         Right eye: No discharge.         Left eye: No discharge.      Conjunctiva/sclera: Conjunctivae normal.   Cardiovascular:      Rate and Rhythm: Normal rate and regular rhythm.      Pulses: Normal pulses.   Pulmonary:      Effort: He is intubated.      Breath sounds: Wheezing present. No rhonchi.   Abdominal:      General: Bowel sounds are normal.      Palpations: Abdomen is soft.   Genitourinary:     Comments: Brown in place.   Musculoskeletal:         General: No swelling, deformity or signs of injury.   Skin:     General: Skin is warm and dry.      Capillary Refill: Capillary refill takes less than 2  seconds.   Neurological:      Comments: Follows commands off sedation, but quickly becomes agitated.      Review of Systems   Unable to perform ROS: Intubated     Vents:  Vent Mode: CPAP / PSV (03/07/23 1340)  Ventilator Initiated: Yes (03/06/23 0622)  Set Rate: 26 BPM (03/07/23 0726)  Vt Set: 450 mL (03/07/23 0726)  Pressure Support: 12 cmH20 (03/07/23 1340)  PEEP/CPAP: 5 cmH20 (03/07/23 1340)  Oxygen Concentration (%): 35 (03/07/23 1340)  Peak Airway Pressure: 17.8 cmH20 (03/07/23 1340)  Total Ve: 6.2 L/m (03/07/23 1340)  F/VT Ratio<105 (RSBI): (!) 18 (03/07/23 1340)    Lines/Drains/Airways       Drain  Duration                  NG/OG Tube 03/06/23 0555 [place] sump 18 Fr. Center mouth 1 day         Urethral Catheter 03/06/23 0555 Latex 16 Fr. 1 day              Airway  Duration                  Airway - Non-Surgical 03/06/23 0546 1 day              Peripheral Intravenous Line  Duration                  Peripheral IV - Single Lumen 03/06/23 0509 20 G Distal;Posterior;Right Wrist 1 day         Peripheral IV - Single Lumen 03/06/23 0510 18 G Left Antecubital 1 day         Peripheral IV - Single Lumen 03/06/23 0740 20 G Left;Posterior Hand 1 day                    Significant Labs:    CBC/Anemia Profile:  Recent Labs   Lab 03/06/23  0525 03/07/23  0451   WBC 15.75* 14.12*   HGB 12.2* 10.7*   HCT 39.6* 32.7*    215   MCV 92 88   RDW 13.1 13.3        Chemistries:  Recent Labs   Lab 03/06/23  0525 03/07/23  0451 03/07/23  1317    142 142   K 3.7 2.8* 3.1*   CL 94* 94* 92*   CO2 35* 31* 39*   BUN 6* 18 22   CREATININE 0.8 1.1 1.0   CALCIUM 9.0 9.0 8.9   ALBUMIN 3.7  --   --    PROT 7.8  --   --    BILITOT 0.4  --   --    ALKPHOS 92  --   --    ALT 17  --   --    AST 16  --   --    MG 2.1 2.1  --    PHOS  --  2.3*  --        All pertinent labs within the past 24 hours have been reviewed.    Significant Imaging:  I have reviewed all pertinent imaging results/findings within the past 24 hours.      ABG  Recent  Labs   Lab 03/06/23  1247   PH 7.389   PO2 86   PCO2 58.6*   HCO3 35.4*   BE 9     Assessment/Plan:     Pulmonary  * Acute respiratory failure with hypoxia and hypercarbia  Likely combination of CHF and COPD exacerbations. CXR with no focal consolidation, vascular congestion, and pulmonary edema. BNP >1,000. WBC mildly elevated. Wheezing on exam. Known emphysema and PREETI with home CPAP. COVID negative.   - maintain LPV. Wean for SpO2 >88%  - continue scheduled duonebs q4h  - continue steroids for 5 days; transition to prednisone 40mg daily when able to tolerated PO  - CAP coverage with rocephin X 5 days and azithro X 3 days  - elevated procal; f/u sputum culture  - continue aggressive diuresis  - daily evaluation for SAT/SBT    COPD (chronic obstructive pulmonary disease)  HX of COPD. PFT in 2017 with moderate restriction with reduced DLCO.  Previous CT chest imaging with emphysema.    - treatment discussed in respiratory failure section    Cardiac/Vascular  Coronary artery disease involving native coronary artery of native heart without angina pectoris  Continue ASA, plavix    Dyslipidemia  Continue statin    Acute on chronic combined systolic and diastolic heart failure  Diurese as tolerated.     Benign essential hypertension  Continue home antihypertensives as tolerated    Endocrine  Uncontrolled type 2 diabetes mellitus with hyperglycemia  Hold oral antidiabetics while inpatient.   - SSI  - BG goal 140-180    Other  Tobacco abuse   Discuss need for smoking cessation when able.    Plan discussed with Dr. Leroy.     Critical Care Time: 50 minutes  Critical care was time spent personally by me on the following activities: development of treatment plan with patient or surrogate and bedside caregivers, discussions with consultants, evaluation of patient's response to treatment, examination of patient, ordering and performing treatments and interventions, ordering and review of laboratory studies, ordering and review  of radiographic studies, pulse oximetry, re-evaluation of patient's condition. This critical care time did not overlap with that of any other provider or involve time for any procedures.     Lydia Falcon NP  Pulmonology  [facility] - Intensive Care     none

## 2024-07-09 NOTE — H&P PST ADULT - PROBLEM SELECTOR PLAN 3
Patient instructed to take ramipril , metoprolol with a sip of water on the morning of procedure.   Requesting cardiologist for echo and stress report .

## 2024-07-09 NOTE — H&P PST ADULT - HISTORY OF PRESENT ILLNESS
74 year old male with hx of HTN, Afib on Eliquis ,      malignant neoplasm of prostate is scheduled for transperineal fusion prostate biopsy.    PSA level 2.94ng/ml 74 year old male with hx of HTN, Afib on Eliquis , ED, BPH , prostate cancer on active surveillance with PSA level 2.94ng/ml on 06/03/24 presents to PST with pre op dx of malignant neoplasm of prostate is scheduled for transperineal fusion prostate biopsy.

## 2024-07-09 NOTE — H&P PST ADULT - HEIGHT IN FEET
Infant assessed. VSS. Brottle feeding with formula Q 3 hrs. Parents of infant educated regarding bulb syringe and emergency call light. POC discussed with parents of infant. All questions answered at this time.    5

## 2024-07-09 NOTE — H&P PST ADULT - NS HP PST ANES REACTION
No Detail Level: Zone Add High Risk Medication Management Associated Diagnosis?: No Length Of Therapy: 4 months

## 2024-07-10 DIAGNOSIS — C61 MALIGNANT NEOPLASM OF PROSTATE: ICD-10-CM

## 2024-07-10 LAB
CULTURE RESULTS: SIGNIFICANT CHANGE UP
SPECIMEN SOURCE: SIGNIFICANT CHANGE UP

## 2024-07-11 PROBLEM — I48.91 UNSPECIFIED ATRIAL FIBRILLATION: Chronic | Status: ACTIVE | Noted: 2024-07-09

## 2024-07-11 PROBLEM — Z87.438 PERSONAL HISTORY OF OTHER DISEASES OF MALE GENITAL ORGANS: Chronic | Status: ACTIVE | Noted: 2024-07-09

## 2024-07-11 PROBLEM — C61 MALIGNANT NEOPLASM OF PROSTATE: Chronic | Status: ACTIVE | Noted: 2024-07-09

## 2024-07-15 ENCOUNTER — TRANSCRIPTION ENCOUNTER (OUTPATIENT)
Age: 74
End: 2024-07-15

## 2024-07-15 ENCOUNTER — NON-APPOINTMENT (OUTPATIENT)
Age: 74
End: 2024-07-15

## 2024-07-16 ENCOUNTER — OUTPATIENT (OUTPATIENT)
Dept: OUTPATIENT SERVICES | Facility: HOSPITAL | Age: 74
LOS: 1 days | Discharge: HOME | End: 2024-07-16
Payer: MEDICARE

## 2024-07-16 ENCOUNTER — APPOINTMENT (OUTPATIENT)
Dept: UROLOGY | Facility: AMBULATORY SURGERY CENTER | Age: 74
End: 2024-07-16

## 2024-07-16 ENCOUNTER — RESULT REVIEW (OUTPATIENT)
Age: 74
End: 2024-07-16

## 2024-07-16 ENCOUNTER — TRANSCRIPTION ENCOUNTER (OUTPATIENT)
Age: 74
End: 2024-07-16

## 2024-07-16 VITALS
RESPIRATION RATE: 16 BRPM | HEART RATE: 58 BPM | SYSTOLIC BLOOD PRESSURE: 130 MMHG | DIASTOLIC BLOOD PRESSURE: 83 MMHG | OXYGEN SATURATION: 100 % | TEMPERATURE: 97 F

## 2024-07-16 VITALS
TEMPERATURE: 98 F | WEIGHT: 242.07 LBS | SYSTOLIC BLOOD PRESSURE: 147 MMHG | OXYGEN SATURATION: 97 % | RESPIRATION RATE: 16 BRPM | DIASTOLIC BLOOD PRESSURE: 79 MMHG | HEART RATE: 67 BPM | HEIGHT: 69 IN

## 2024-07-16 DIAGNOSIS — S02.85XA FRACTURE OF ORBIT, UNSPECIFIED, INITIAL ENCOUNTER FOR CLOSED FRACTURE: Chronic | ICD-10-CM

## 2024-07-16 DIAGNOSIS — C61 MALIGNANT NEOPLASM OF PROSTATE: ICD-10-CM

## 2024-07-16 DIAGNOSIS — Z98.890 OTHER SPECIFIED POSTPROCEDURAL STATES: Chronic | ICD-10-CM

## 2024-07-16 PROCEDURE — 76999F: CUSTOM | Mod: 26

## 2024-07-16 PROCEDURE — 88342 IMHCHEM/IMCYTCHM 1ST ANTB: CPT | Mod: 26,59

## 2024-07-16 PROCEDURE — 88344 IMHCHEM/IMCYTCHM EA MLT ANTB: CPT | Mod: 26

## 2024-07-16 PROCEDURE — 55706 BX PRST8 NDL SAT SAMPLING: CPT

## 2024-07-16 PROCEDURE — G0416: CPT | Mod: 26

## 2024-07-16 PROCEDURE — 88381 MICRODISSECTION MANUAL: CPT | Mod: 26

## 2024-07-16 RX ORDER — DEXTROSE MONOHYDRATE AND SODIUM CHLORIDE 5; .3 G/100ML; G/100ML
1000 INJECTION, SOLUTION INTRAVENOUS
Refills: 0 | Status: DISCONTINUED | OUTPATIENT
Start: 2024-07-16 | End: 2024-07-30

## 2024-07-16 RX ORDER — TAMSULOSIN HYDROCHLORIDE 0.4 MG/1
1 CAPSULE ORAL
Refills: 0 | DISCHARGE

## 2024-07-16 RX ORDER — METOPROLOL TARTRATE 50 MG
0 TABLET ORAL
Refills: 0 | DISCHARGE

## 2024-07-16 RX ORDER — APIXABAN 5 MG/1
1 TABLET, FILM COATED ORAL
Refills: 0 | DISCHARGE

## 2024-07-16 NOTE — ASU PREOPERATIVE ASSESSMENT, ADULT (IPARK ONLY) - PAIN: PRESENCE, MLM
Spoke with Huong, Cancer Care Navigator for this patient. Huong reports patient suffers from anxiety and depression and is having a difficult time coping with her cancer diagnosis. Patient has started to use alcohol again to calm her nerves.  See Huong's note from 10/6/2017. Huong is asking if a Behavorial Health Referral would be appropriate? If so,Huong would assist patient with getting an appointment set up as soon as possible.    Referral placed.  Huong calling to assist patient with scheduling.  Please advise if you disagree.    denies pain/discomfort (Rating = 0)

## 2024-07-16 NOTE — ASU DISCHARGE PLAN (ADULT/PEDIATRIC) - CARE PROVIDER_API CALL
Jet Short  Urology  [address] [place] [address], [facility] [address]  [place] [address]  Phone: [phone]  Fax: [phone]  Follow Up Time: Routine

## 2024-07-16 NOTE — ASU PREOPERATIVE ASSESSMENT, ADULT (IPARK ONLY) - FALL HARM RISK - CONCLUSION
BIB TONE, sister witness 3 min tonic clonic seizure. Pt ran out of prescription for Keppra since 02/2020. Pt had one beer prior to seizure.    Universal Safety Interventions

## 2024-07-16 NOTE — ASU DISCHARGE PLAN (ADULT/PEDIATRIC) - ASU DC SPECIAL INSTRUCTIONSFT
PAIN CONTROL: You may take 650 mg of Tylenol every 4-6 hours. Do not exceed 4 grams of Tylenol daily. Take oxycodone as needed for severe pain, one tab every 6 hours. Do not exceed 4 tabs daily.  WOUND Care- expected to see blood in the stool or urine for a few days after the procedure.  ANTIBIOTICS: None  BATHING:resume showering as usual  ANTICOAGULATION: Restart Eliquis in 2 days.  ACTIVITY: No heavy lifting or straining. Otherwise, you may return to your usual level of physical activity.  DIET: Return to your usual diet.  NOTIFY YOUR SURGEON IF: You have any bleeding that does not stop, any pus draining from your wound, any fever (over 100.4 F) or chills, persistent nausea/vomiting, persistent diarrhea, or if your pain is not controlled on your discharge pain medications.  FOLLOW-UP:  1. Please call your surgeon to make a follow-up appointment within 1-2 weeks of discharge  2. Please follow up with your primary care physician in 1-2 weeks  3. Dr. Short will call you with the results

## 2024-07-17 ENCOUNTER — NON-APPOINTMENT (OUTPATIENT)
Age: 74
End: 2024-07-17

## 2024-07-17 ENCOUNTER — OUTPATIENT (OUTPATIENT)
Dept: OUTPATIENT SERVICES | Facility: HOSPITAL | Age: 74
LOS: 1 days | End: 2024-07-17
Payer: MEDICARE

## 2024-07-17 DIAGNOSIS — Z98.890 OTHER SPECIFIED POSTPROCEDURAL STATES: Chronic | ICD-10-CM

## 2024-07-17 DIAGNOSIS — R97.20 ELEVATED PROSTATE SPECIFIC ANTIGEN [PSA]: ICD-10-CM

## 2024-07-17 DIAGNOSIS — S02.85XA FRACTURE OF ORBIT, UNSPECIFIED, INITIAL ENCOUNTER FOR CLOSED FRACTURE: Chronic | ICD-10-CM

## 2024-07-17 PROCEDURE — C8001: CPT

## 2024-07-17 PROCEDURE — 76377 3D RENDER W/INTRP POSTPROCES: CPT | Mod: 26

## 2024-07-24 ENCOUNTER — NON-APPOINTMENT (OUTPATIENT)
Age: 74
End: 2024-07-24

## 2024-07-25 LAB — SURGICAL PATHOLOGY STUDY: SIGNIFICANT CHANGE UP

## 2024-07-29 ENCOUNTER — APPOINTMENT (OUTPATIENT)
Dept: UROLOGY | Facility: CLINIC | Age: 74
End: 2024-07-29
Payer: MEDICARE

## 2024-07-29 DIAGNOSIS — R35.1 NOCTURIA: ICD-10-CM

## 2024-07-29 PROCEDURE — G2211 COMPLEX E/M VISIT ADD ON: CPT

## 2024-07-29 PROCEDURE — 99214 OFFICE O/P EST MOD 30 MIN: CPT

## 2024-07-29 NOTE — PHYSICAL EXAM
[General Appearance - Well Developed] : well developed [General Appearance - Well Nourished] : well nourished [] : no respiratory distress [Exaggerated Use Of Accessory Muscles For Inspiration] : no accessory muscle use [Oriented To Time, Place, And Person] : oriented to person, place, and time [Affect] : the affect was normal [Mood] : the mood was normal [Not Anxious] : not anxious [de-identified] : Genital exam was done previously

## 2024-07-29 NOTE — LETTER CLOSING
[Consult Closing:] : Thank you for allowing me to participate in the care of this patient.  If you have any questions, please do not hesitate to contact me. [FreeTextEntry3] : Dr Carmelo Spivey Address: [address] [place] [address] Phone: [phone]

## 2024-07-29 NOTE — DISEASE MANAGEMENT
[1] : T1 [c] : c [0] : M0 [0-10] : 0 -10 ng/mL [Biopsy with Fusion] : Patient had a biopsy with fusion on [7(4+3)] : Fusion Biopsy [place] Score: 7(4+3) [] : Patient had a Prostate MRI [4] : 4 [IIC] : IIC [BiopsyDate] : 07/24 [MeasuredProstateVolume] : 140 [TotalCores] : 13 [TotalPositiveCores] : 2 [MaxCoreInvolvement] : 65

## 2024-07-29 NOTE — DISEASE MANAGEMENT
[1] : T1 [c] : c [0] : M0 [0-10] : 0 -10 ng/mL [Biopsy with Fusion] : Patient had a biopsy with fusion on [7(4+3)] : Fusion Biopsy [place] Score: 7(4+3) [] : Patient had a Prostate MRI [4] : 4 [IIC] : IIC [BiopsyDate] : 07/24 [MeasuredProstateVolume] : 140 [TotalCores] : 13 [TotalPositiveCores] : 2 [MaxCoreInvolvement] : 65

## 2024-07-29 NOTE — PHYSICAL EXAM
[General Appearance - Well Developed] : well developed [General Appearance - Well Nourished] : well nourished [] : no respiratory distress [Exaggerated Use Of Accessory Muscles For Inspiration] : no accessory muscle use [Oriented To Time, Place, And Person] : oriented to person, place, and time [Affect] : the affect was normal [Mood] : the mood was normal [Not Anxious] : not anxious [de-identified] : Genital exam was done previously

## 2024-07-29 NOTE — HISTORY OF PRESENT ILLNESS
[FreeTextEntry1] : He is a 74-year-old man who is seen today in follow-up for prostate cancer on active surveillance, ED, large prostate and urinary symptoms.  In June 2024, PSA level was 2.9.  MRI of the prostate in June 2024 showed 140 g prostate and a right-sided PI-RADS 4 lesion.  Transperineal fusion prostate biopsy in July 2024 showed Hipolito 4+3 prostate cancer from the target area and another area on the right side with perineural invasion and intraductal carcinoma.  Usually, nocturia is 2 or 3 times.  He is on tamsulosin and finasteride.  He has intermittent response to sildenafil 100 mg.  Residual urine volume was minimal.  In October 2022 PSA was 2.97 and in July 2023 was 3.9 by his primary care physician, according to patient.  He has used Stendra and tadalafil in the past.  Previous notes: MRI of the prostate in June 2022 showed 133 g prostate and no MRI suspicious lesions. He underwent hematuria work-up with cystoscopy, CT scan and urine studies in August 2020 which were normal. He is a nonsmoker.   Pelvic MRI in August 2018 shows prostate volume 116 cc and no suspicious lesions. In August 2016, he was diagnosed with small volume Hipolito 6 prostate cancer, PSA of 5.6 and was placed on observation. MRI prior to the biopsy showed prostate volume 103 cc and a small area with suspicion score of 3/5. In April 2016, CT scan showed small bilateral adrenal adenomas.

## 2024-07-29 NOTE — LETTER CLOSING
[Consult Closing:] : Thank you for allowing me to participate in the care of this patient.  If you have any questions, please do not hesitate to contact me. [FreeTextEntry3] : Dr Carmelo Spivey Address: [address] [place] [address] Phone: [phone]

## 2024-08-01 ENCOUNTER — APPOINTMENT (OUTPATIENT)
Dept: UROLOGY | Facility: CLINIC | Age: 74
End: 2024-08-01
Payer: MEDICARE

## 2024-08-01 DIAGNOSIS — C61 MALIGNANT NEOPLASM OF PROSTATE: ICD-10-CM

## 2024-08-01 PROCEDURE — G2211 COMPLEX E/M VISIT ADD ON: CPT

## 2024-08-01 PROCEDURE — 99215 OFFICE O/P EST HI 40 MIN: CPT

## 2024-08-01 NOTE — LETTER CLOSING
[Consult Closing:] : Thank you for allowing me to participate in the care of this patient.  If you have any questions, please do not hesitate to contact me. [FreeTextEntry3] : Sincerely,      Wero Guerra MD, FACS Director of Urology Services, [facility] Chief of Urology, [facility]  of Urology   [facility], [place] [id number] P: [phone] F: [phone] [facility]

## 2024-08-01 NOTE — DISEASE MANAGEMENT
[1] : T1 [c] : c [0] : M0 [0-10] : 0 -10 ng/mL [Biopsy with Fusion] : Patient had a biopsy with fusion on [7(4+3)] : Fusion Biopsy [place] Score: 7(4+3) [Biopsy results sent to PCP/Referring Physician] : Biopsy results sent to PCP/Referring Physician [] : Patient had a Prostate MRI [4] : 4 [IIC] : IIC [BiopsyDate] : 07/24 [MeasuredProstateVolume] : 140 [TotalCores] : 13 [TotalPositiveCores] : 2 [MaxCoreInvolvement] : 65

## 2024-08-01 NOTE — PHYSICAL EXAM
[General Appearance - Well Developed] : well developed [General Appearance - Well Nourished] : well nourished [] : no respiratory distress [Exaggerated Use Of Accessory Muscles For Inspiration] : no accessory muscle use [Affect] : the affect was normal [Oriented To Time, Place, And Person] : oriented to person, place, and time [Mood] : the mood was normal [Not Anxious] : not anxious [de-identified] : Genital exam was done previously

## 2024-08-01 NOTE — HISTORY OF PRESENT ILLNESS
[FreeTextEntry1] : Ned Luis presents to the office today.  He is 74 years old and recently diagnosed with [place] 4+3 prostate cancer.  This was discovered after MRI of the prostate showed an abnormal lesion at the right posterior lateral peripheral zone base.  PI-RADS category 4.  Biopsies of this area demonstrated the presence of [place] 4+3 prostate cancer.  He did not have cancer detected in the remainder of his prostate biopsy.  He has marked enlargement of the prostate with a measured size of 140 cubic cm.  He also does have baseline urinary symptoms including nocturia as well as daytime frequency.  He has been on finasteride and tamsulosin both for several years.  He thinks these are somewhat helpful but he is not certain as he has been on them now for quite some time.  He denies sleep apnea although his wife with him today thinks he may have sleep apnea given his breathing patterns at night.

## 2024-08-01 NOTE — LETTER GREETING
[Dear  ___] : Dear  [unfilled], [Follow-Up] : Your patient, [unfilled] was seen in my office today for follow-up [Please see my note below.] : Please see my note below. [FreeTextEntry2] : Carmelo Spivey DO [address] [facility] [address] [place] [address]

## 2024-08-01 NOTE — DISEASE MANAGEMENT
[1] : T1 [c] : c [0] : M0 [0-10] : 0 -10 ng/mL [Biopsy with Fusion] : Patient had a biopsy with fusion on [7(4+3)] : Fusion Biopsy [place] Score: 7(4+3) [Biopsy results sent to PCP/Referring Physician] : Biopsy results sent to PCP/Referring Physician [] : Patient had a Prostate MRI [4] : 4 [IIC] : IIC [BiopsyDate] : 07/24 [MeasuredProstateVolume] : 140 [TotalCores] : 13 [TotalPositiveCores] : 2 [MaxCoreInvolvement] : 65

## 2024-08-01 NOTE — HISTORY OF PRESENT ILLNESS
[FreeTextEntry1] : Ned Luis presents to the office today.  He is 74 years old and recently diagnosed with [place] 4+3 prostate cancer.  This was discovered after MRI of the prostate showed an abnormal lesion at the right posterior lateral peripheral zone base.  PI-RADS category 4.  Biopsies of this area demonstrated the presence of [place] 4+3 prostate cancer.  He did not have cancer detected in the remainder of his prostate biopsy.  He has marked enlargement of the prostate with a measured size of 140 cubic cm.  He also does have baseline urinary symptoms including nocturia as well as daytime frequency.  He has been on finasteride and tamsulosin both for several years.  He thinks these are somewhat helpful but he is not certain as he has been on them now for quite some time.  He denies sleep apnea although his wife with him today thinks he may have sleep apnea given his breathing patterns at night.

## 2024-08-01 NOTE — PHYSICAL EXAM
[General Appearance - Well Developed] : well developed [General Appearance - Well Nourished] : well nourished [] : no respiratory distress [Exaggerated Use Of Accessory Muscles For Inspiration] : no accessory muscle use [Oriented To Time, Place, And Person] : oriented to person, place, and time [Affect] : the affect was normal [Mood] : the mood was normal [Not Anxious] : not anxious [de-identified] : Genital exam was done previously

## 2024-08-01 NOTE — LETTER GREETING
[Dear  ___] : Dear  [unfilled], [Follow-Up] : Your patient, [unfilled] was seen in my office today for follow-up [Please see my note below.] : Please see my note below. [FreeTextEntry2] : Carmelo Spivey DO [address] [facility] [address] [place] [address]

## 2024-08-01 NOTE — LETTER CLOSING
[Consult Closing:] : Thank you for allowing me to participate in the care of this patient.  If you have any questions, please do not hesitate to contact me. [FreeTextEntry3] : Sincerely,      Wero Guerra MD, FACS Director of Urology Services, [facility] Chief of Urology, [facility]  of [facility]rology   [facility], [place] [id number] P: [phone] F: [phone] [email or web address]

## 2024-08-13 ENCOUNTER — APPOINTMENT (OUTPATIENT)
Dept: RADIATION ONCOLOGY | Facility: CLINIC | Age: 74
End: 2024-08-13
Payer: MEDICARE

## 2024-08-13 VITALS
BODY MASS INDEX: 32.93 KG/M2 | DIASTOLIC BLOOD PRESSURE: 96 MMHG | SYSTOLIC BLOOD PRESSURE: 149 MMHG | WEIGHT: 230 LBS | OXYGEN SATURATION: 98 % | RESPIRATION RATE: 18 BRPM | HEIGHT: 70 IN | HEART RATE: 62 BPM | TEMPERATURE: 36.6 F

## 2024-08-13 DIAGNOSIS — C61 MALIGNANT NEOPLASM OF PROSTATE: ICD-10-CM

## 2024-08-13 PROCEDURE — 99205 OFFICE O/P NEW HI 60 MIN: CPT | Mod: GC

## 2024-08-16 NOTE — DISEASE MANAGEMENT
[Clinical] : TNM Stage: c [1] : T1 [c] : c [0] : N0 [X] : MX [0-10] : 0 -10 ng/mL [Biopsy with Fusion] : Patient had a biopsy with fusion on [7(4+3)] : Fusion Biopsy [place] Score: 7(4+3) [IIC] : IIC [TTNM] : 1c [NTNM] : 0 [MTNM] : 0 [] : Patient had no CT scan performed [BiopsyDate] : 7/16/2024 [MeasuredProstateVolume] : 140 [TotalCores] : 13 [TotalPositiveCores] : 2 [MaxCoreInvolvement] : 65

## 2024-08-16 NOTE — LETTER CLOSING
[Consult Closing:] : Thank you for allowing me to participate in the care of this patient.  If you have any questions, please do not hesitate to contact me. [Sincerely yours,] : Sincerely yours, [FreeTextEntry3] : Maria Ines Freid MD

## 2024-08-16 NOTE — PHYSICAL EXAM
[General Appearance - Alert] : alert [General Appearance - In No Acute Distress] : in no acute distress [Respiration, Rhythm And Depth] : normal respiratory rhythm and effort [] : no respiratory distress [Exaggerated Use Of Accessory Muscles For Inspiration] : no accessory muscle use [Heart Rate And Rhythm] : heart rate and rhythm were normal [Arterial Pulses Normal] : the arterial pulses were normal [Abdomen Soft] : soft [Nondistended] : nondistended [Nail Clubbing] : no clubbing  or cyanosis of the fingernails [No Focal Deficits] : no focal deficits [Normal] : well developed, well nourished, in no acute distress [Obese] : obese [Sclera] : the sclera and conjunctiva were normal [Outer Ear] : the ears and nose were normal in appearance

## 2024-08-16 NOTE — HISTORY OF PRESENT ILLNESS
[FreeTextEntry1] : Mr. Luis is a 74-year-old male on active surveillance for prostate cancer, referred for consultation regarding radiation therapy.   He was previously under the care of Dr. Abraham Nunez and diagnosed with low-risk prostate cancer in 2016. There was reportedly [place] 6 disease with a PSA of 5.6. MRI at the time showed a 103 cc prostate volume and a PI-RADS 3 lesion. He was managed with active surveillance. Subsequent prostate MRI from  and  reportedly showed no suspicious lesions. Due to the enlarged prostate, he has been on finasteride for the past several years. He transferred his care to Dr. Short in 2019.  The PSA on 6/3/24 was 2.94 ng/mL.   Prostate MRI on 24 showed a 140 cc prostate volume and central gland hypertrophy. A lesion was noted in the right posterior lateral base peripheral zone measuring up to 13 mm, PI-RADS 4, and noted to be more conspicuous compared to prior MRI in . There was no extracapsular extension. There was no seminal vesicle invasion, and the neurovascular bundles were unremarkable. There was no pelvic adenopathy and no suspicious osseous lesions.  He underwent prostate biopsy on 24. MRI-targeted biopsy of the right peripheral zone lesion showed adenocarcinoma with Hipolito score 4+3=7 involving 35%/30%/<5%% of 3 out of 3 cores, with 70% [place] 4 pattern, and perineural invasion. Cribriform Hipolito pattern 4 and intraductal carcinoma of the prostate were present. Template biopsy showed adenocarcinoma in 1 out of 12 cores. Adenocarcinoma with Hipolito score 4+3=7 was present in 65% of 1 out of 3 cores, with 90% [place] 4 pattern. Intraductal carcinoma was present. The remaining cores were benign. In total, there were 2 out of 13 positive cores.   He reports frequency during the day and nocturia about 4-5 times every night.  He has been on finasteride 5 mg and tamsulosin 0.4 mg nightly for over 5 years.  He reports incomplete bladder emptying.  He denies blood in his urination or blood in his stools.  He denies any pain on urination.  He has had erectile dysfunction and has tried PDE inhibitors, but not using regularly.   His father had prostate cancer and  in his 80's. His maternal uncles had prostate cancer. He underwent colonoscopy 3 years ago and was advised to return after 5 years.

## 2024-08-16 NOTE — REASON FOR VISIT
[Prostate Cancer] : prostate cancer [Consideration of Curative Therapy] : consideration of curative therapy for prostate cancer [Spouse] : spouse

## 2024-08-16 NOTE — PHYSICAL EXAM
F/U with Dr Alicia Feliciano on Tuesday [General Appearance - Alert] : alert [General Appearance - In No Acute Distress] : in no acute distress [Respiration, Rhythm And Depth] : normal respiratory rhythm and effort [] : no respiratory distress [Exaggerated Use Of Accessory Muscles For Inspiration] : no accessory muscle use [Heart Rate And Rhythm] : heart rate and rhythm were normal [Arterial Pulses Normal] : the arterial pulses were normal [Abdomen Soft] : soft [Nondistended] : nondistended [Nail Clubbing] : no clubbing  or cyanosis of the fingernails [No Focal Deficits] : no focal deficits [Normal] : well developed, well nourished, in no acute distress [Obese] : obese [Sclera] : the sclera and conjunctiva were normal [Outer Ear] : the ears and nose were normal in appearance

## 2024-08-16 NOTE — DISEASE MANAGEMENT
[Clinical] : TNM Stage: c [1] : T1 [c] : c [0] : N0 [X] : MX [0-10] : 0 -10 ng/mL [Biopsy with Fusion] : Patient had a biopsy with fusion on [7(4+3)] : Fusion Biopsy [place] Score: 7(4+3) [IIC] : IIC [TTNM] : 1c [NTNM] : 0 [MTNM] : 0 [] : Patient had no CT scan performed [BiopsyDate] : 7/16/2024 [MeasuredProstateVolume] : 140 [TotalCores] : 13 [TotalPositiveCores] : 2 [MaxCoreInvolvement] : 65

## 2024-08-16 NOTE — REVIEW OF SYSTEMS
[IPSS Score (0-40): ___] : IPSS score: [unfilled] [EPIC-CP Score (0-60): ___] : EPIC-CP score: [unfilled] [Negative] : Allergic/Immunologic [Hematuria: Grade 0] : Hematuria: Grade 0 [Urinary Incontinence: Grade 0] : Urinary Incontinence: Grade 0  [Urinary Retention: Grade 0] : Urinary Retention: Grade 0 [Urinary Tract Pain: Grade 0] : Urinary Tract Pain: Grade 0 [Urinary Urgency: Grade 0] : Urinary Urgency: Grade 0 [Urinary Frequency: Grade 0] : Urinary Frequency: Grade 0 [Diarrhea: Grade 0] : Diarrhea: Grade 0 [Proctitis: Grade 0] : Proctitis: Grade 0 [Rectal Pain: Grade 0] : Rectal Pain: Grade 0 [Urinary Retention: Grade 2 - Placement of urinary, suprapubic or intermittent catheter placement indicated; medication indicated] : Urinary Retention: Grade 2 - Placement of urinary, suprapubic or intermittent catheter placement indicated; medication indicated [Urinary Frequency: Grade 2 - Limiting instrumental ADL; medical management indicated] : Urinary Frequency: Grade 2 - Limiting instrumental ADL; medical management indicated

## 2024-08-16 NOTE — DISEASE MANAGEMENT
[Clinical] : TNM Stage: c [1] : T1 [c] : c [0] : N0 [X] : MX [0-10] : 0 -10 ng/mL [Biopsy with Fusion] : Patient had a biopsy with fusion on [7(4+3)] : Fusion Biopsy [place] Score: 7(4+3) [IIC] : IIC [TTNM] : 1c [NTNM] : 0 [MTNM] : 0 [] : Patient had no CT scan performed [BiopsyDate] : 7/16/2024 [TotalCores] : 13 [MeasuredProstateVolume] : 140 [TotalPositiveCores] : 2 [MaxCoreInvolvement] : 65

## 2024-08-16 NOTE — HISTORY OF PRESENT ILLNESS
[FreeTextEntry1] : Mr. Luis is a 74-year-old male on active surveillance for prostate cancer, referred for consultation regarding radiation therapy.   He was previously under the care of Dr. Abraham Nunez and diagnosed with low-risk prostate cancer in 2016. There was reportedly [place] 6 disease with a PSA of 5.6. MRI at the time showed a 103 cc prostate volume and a PI-RADS 3 lesion. He was managed with active surveillance. Subsequent prostate MRI from  and  reportedly showed no suspicious lesions. Due to the enlarged prostate, he has been on finasteride for the past several years. He transferred his care to Dr. Short in 2019.  The PSA on 6/3/24 was 2.94 ng/mL.   Prostate MRI on 24 showed a 140 cc prostate volume and central gland hypertrophy. A lesion was noted in the right posterior lateral base peripheral zone measuring up to 13 mm, PI-RADS 4, and noted to be more conspicuous compared to prior MRI in . There was no extracapsular extension. There was no seminal vesicle invasion, and the neurovascular bundles were unremarkable. There was no pelvic adenopathy and no suspicious osseous lesions.  He underwent prostate biopsy on 24. MRI-targeted biopsy of the right peripheral zone lesion showed adenocarcinoma with Hipolito score 4+3=7 involving 35%/30%/<5%% of 3 out of 3 cores, with 70% [place] 4 pattern, and perineural invasion. Cribriform Hipolito pattern 4 and intraductal carcinoma of the prostate were present. Template biopsy showed adenocarcinoma in 1 out of 12 cores. Adenocarcinoma with Hipolito score 4+3=7 was present in 65% of 1 out of 3 cores, with 90% [place] 4 pattern. Intraductal carcinoma was present. The remaining cores were benign. In total, there were 2 out of 13 positive cores.   He reports frequency during the day and nocturia about 4-5 times every night.  He has been on finasteride 5 mg and tamsulosin 0.4 mg nightly for over 5 years.  He reports incomplete bladder emptying.  He denies blood in his urination or blood in his stools.  He denies any pain on urination.  He has had erectile dysfunction and has tried PDE inhibitors, but not using regularly.   His father had prostate cancer and  in his 80's. His maternal uncles had prostate cancer. He underwent colonoscopy 3 years ago and was advised to return after 5 years.

## 2024-08-16 NOTE — LETTER CLOSING
[Consult Closing:] : Thank you for allowing me to participate in the care of this patient.  If you have any questions, please do not hesitate to contact me. [Sincerely yours,] : Sincerely yours, [FreeTextEntry3] : Maria Ines Fried MD

## 2024-08-16 NOTE — HISTORY OF PRESENT ILLNESS
[FreeTextEntry1] : Mr. Luis is a 74-year-old male on active surveillance for prostate cancer, referred for consultation regarding radiation therapy.   He was previously under the care of Dr. Abraham Nunez and diagnosed with low-risk prostate cancer in 2016. There was reportedly [place] 6 disease with a PSA of 5.6. MRI at the time showed a 103 cc prostate volume and a PI-RADS 3 lesion. He was managed with active surveillance. Subsequent prostate MRI from  and  reportedly showed no suspicious lesions. Due to the enlarged prostate, he has been on finasteride for the past several years. He transferred his care to Dr. Short in 2019.  The PSA on 6/3/24 was 2.94 ng/mL.   Prostate MRI on 24 showed a 140 cc prostate volume and central gland hypertrophy. A lesion was noted in the right posterior lateral base peripheral zone measuring up to 13 mm, PI-RADS 4, and noted to be more conspicuous compared to prior MRI in . There was no extracapsular extension. There was no seminal vesicle invasion, and the neurovascular bundles were unremarkable. There was no pelvic adenopathy and no suspicious osseous lesions.  He underwent prostate biopsy on 24. MRI-targeted biopsy of the right peripheral zone lesion showed adenocarcinoma with Hipolito score 4+3=7 involving 35%/30%/<5%% of 3 out of 3 cores, with 70% [place] 4 pattern, and perineural invasion. Cribriform Hipolito pattern 4 and intraductal carcinoma of the prostate were present. Template biopsy showed adenocarcinoma in 1 out of 12 cores. Adenocarcinoma with Hipolito score 4+3=7 was present in 65% of 1 out of 3 cores, with 90% [place] 4 pattern. Intraductal carcinoma was present. The remaining cores were benign. In total, there were 2 out of 13 positive cores.   He reports frequency during the day and nocturia about 4-5 times every night.  He has been on finasteride 5 mg and tamsulosin 0.4 mg nightly for over 5 years.  He reports incomplete bladder emptying.  He denies blood in his urination or blood in his stools.  He denies any pain on urination.  He has had erectile dysfunction and has tried PDE inhibitors, but not using regularly.   His father had prostate cancer and  in his 80's. His maternal uncles had prostate cancer. He underwent colonoscopy 3 years ago and was advised to return after 5 years.

## 2024-08-16 NOTE — DISEASE MANAGEMENT
[Clinical] : TNM Stage: c [1] : T1 [c] : c [0] : N0 [X] : MX [0-10] : 0 -10 ng/mL [Biopsy with Fusion] : Patient had a biopsy with fusion on [7(4+3)] : Fusion Biopsy [place] Score: 7(4+3) [IIC] : IIC [TTNM] : 1c [NTNM] : 0 [MTNM] : 0 [] : Patient had no CT scan performed [BiopsyDate] : 7/16/2024 [TotalCores] : 13 [MeasuredProstateVolume] : 140 [TotalPositiveCores] : 2 [MaxCoreInvolvement] : 65

## 2024-08-16 NOTE — HISTORY OF PRESENT ILLNESS
[FreeTextEntry1] : Mr. Luis is a 74-year-old male on active surveillance for prostate cancer, referred for consultation regarding radiation therapy.   He was previously under the care of Dr. Abraham Nunez and diagnosed with low-risk prostate cancer in 2016. There was reportedly [place] 6 disease with a PSA of 5.6. MRI at the time showed a 103 cc prostate volume and a PI-RADS 3 lesion. He was managed with active surveillance. Subsequent prostate MRI from  and  reportedly showed no suspicious lesions. Due to the enlarged prostate, he has been on finasteride for the past several years. He transferred his care to Dr. Short in 2019.  The PSA on 6/3/24 was 2.94 ng/mL.   Prostate MRI on 24 showed a 140 cc prostate volume and central gland hypertrophy. A lesion was noted in the right posterior lateral base peripheral zone measuring up to 13 mm, PI-RADS 4, and noted to be more conspicuous compared to prior MRI in . There was no extracapsular extension. There was no seminal vesicle invasion, and the neurovascular bundles were unremarkable. There was no pelvic adenopathy and no suspicious osseous lesions.  He underwent prostate biopsy on 24. MRI-targeted biopsy of the right peripheral zone lesion showed adenocarcinoma with Hipolito score 4+3=7 involving 35%/30%/<5%% of 3 out of 3 cores, with 70% [place] 4 pattern, and perineural invasion. Cribriform Hipolito pattern 4 and intraductal carcinoma of the prostate were present. Template biopsy showed adenocarcinoma in 1 out of 12 cores. Adenocarcinoma with Hipolito score 4+3=7 was present in 65% of 1 out of 3 cores, with 90% [place] 4 pattern. Intraductal carcinoma was present. The remaining cores were benign. In total, there were 2 out of 13 positive cores.   He reports frequency during the day and nocturia about 4-5 times every night.  He has been on finasteride 5 mg and tamsulosin 0.4 mg nightly for over 5 years.  He reports incomplete bladder emptying.  He denies blood in his urination or blood in his stools.  He denies any pain on urination.  He has had erectile dysfunction and has tried PDE inhibitors, but not using regularly.   His father had prostate cancer and  in his 80's. His maternal uncles had prostate cancer. He underwent colonoscopy 3 years ago and was advised to return after 5 years.

## 2024-08-16 NOTE — DISEASE MANAGEMENT
[Clinical] : TNM Stage: c [1] : T1 [c] : c [0] : N0 [X] : MX [0-10] : 0 -10 ng/mL [Biopsy with Fusion] : Patient had a biopsy with fusion on [7(4+3)] : Fusion Biopsy [place] Score: 7(4+3) [IIC] : IIC [TTNM] : 1c [NTNM] : 0 [MTNM] : 0 [] : Patient had no CT scan performed [BiopsyDate] : 7/16/2024 [MeasuredProstateVolume] : 140 [TotalCores] : 13 [TotalPositiveCores] : 2 [MaxCoreInvolvement] : 65

## 2024-08-16 NOTE — HISTORY OF PRESENT ILLNESS
[FreeTextEntry1] : Mr. Luis is a 74-year-old male on active surveillance for prostate cancer, referred for consultation regarding radiation therapy.   He was previously under the care of Dr. Abraham Nunez and diagnosed with low-risk prostate cancer in 2016. There was reportedly [place] 6 disease with a PSA of 5.6. MRI at the time showed a 103 cc prostate volume and a PI-RADS 3 lesion. He was managed with active surveillance. Subsequent prostate MRI from  and  reportedly showed no suspicious lesions. Due to the enlarged prostate, he has been on finasteride for the past several years. He transferred his care to Dr. Short in 2019.  The PSA on 6/3/24 was 2.94 ng/mL.   Prostate MRI on 24 showed a 140 cc prostate volume and central gland hypertrophy. A lesion was noted in the right posterior lateral base peripheral zone measuring up to 13 mm, PI-RADS 4, and noted to be more conspicuous compared to prior MRI in . There was no extracapsular extension. There was no seminal vesicle invasion, and the neurovascular bundles were unremarkable. There was no pelvic adenopathy and no suspicious osseous lesions.  He underwent prostate biopsy on 24. MRI-targeted biopsy of the right peripheral zone lesion showed adenocarcinoma with Hipolito score 4+3=7 involving 35%/30%/<5%% of 3 out of 3 cores, with 70% [place] 4 pattern, and perineural invasion. Cribriform Hipolito pattern 4 and intraductal carcinoma of the prostate were present. Template biopsy showed adenocarcinoma in 1 out of 12 cores. Adenocarcinoma with Hipolito score 4+3=7 was present in 65% of 1 out of 3 cores, with 90% [place] 4 pattern. Intraductal carcinoma was present. The remaining cores were benign. In total, there were 2 out of 13 positive cores.   He reports frequency during the day and nocturia about 4-5 times every night.  He has been on finasteride 5 mg and tamsulosin 0.4 mg nightly for over 5 years.  He reports incomplete bladder emptying.  He denies blood in his urination or blood in his stools.  He denies any pain on urination.  He has had erectile dysfunction and has tried PDE inhibitors, but not using regularly.   His father had prostate cancer and  in his 80's. His maternal uncles had prostate cancer. He underwent colonoscopy 3 years ago and was advised to return after 5 years.

## 2024-08-16 NOTE — PHYSICAL EXAM
[General Appearance - Alert] : alert [General Appearance - In No Acute Distress] : in no acute distress [] : no respiratory distress [Respiration, Rhythm And Depth] : normal respiratory rhythm and effort [Exaggerated Use Of Accessory Muscles For Inspiration] : no accessory muscle use [Heart Rate And Rhythm] : heart rate and rhythm were normal [Arterial Pulses Normal] : the arterial pulses were normal [Abdomen Soft] : soft [Nondistended] : nondistended [Nail Clubbing] : no clubbing  or cyanosis of the fingernails [No Focal Deficits] : no focal deficits [Normal] : well developed, well nourished, in no acute distress [Obese] : obese [Sclera] : the sclera and conjunctiva were normal [Outer Ear] : the ears and nose were normal in appearance

## 2024-08-26 ENCOUNTER — NON-APPOINTMENT (OUTPATIENT)
Age: 74
End: 2024-08-26

## 2024-08-26 ENCOUNTER — RX RENEWAL (OUTPATIENT)
Age: 74
End: 2024-08-26

## 2024-09-24 ENCOUNTER — RX RENEWAL (OUTPATIENT)
Age: 74
End: 2024-09-24

## 2024-12-04 ENCOUNTER — APPOINTMENT (OUTPATIENT)
Dept: UROLOGY | Facility: CLINIC | Age: 74
End: 2024-12-04

## 2024-12-04 ENCOUNTER — RX RENEWAL (OUTPATIENT)
Age: 74
End: 2024-12-04

## (undated) DEVICE — Device

## (undated) DEVICE — GLV 7.5 PROTEXIS (BLUE)

## (undated) DEVICE — PREP CHLORAPREP HI-LITE ORANGE 3ML

## (undated) DEVICE — GLV 7.5 PROTEXIS (WHITE)

## (undated) DEVICE — CATH ANGIOCATH 14G

## (undated) DEVICE — SYR LUER LOK 20CC

## (undated) DEVICE — NDL SPINAL 20G X 6" QUINCKE

## (undated) DEVICE — BASIN SET DOUBLE

## (undated) DEVICE — POSITIONER FOAM EGG CRATE ULNAR 2PCS (PINK)

## (undated) DEVICE — DRSG TELFA 3 X 8

## (undated) DEVICE — NDL MAX CORE 18G X 25CM

## (undated) DEVICE — GRID BRACHYTHERAPY EZ 18G

## (undated) DEVICE — NEOGUARD ENDOCAVITY PROBE COVER 1 X 11.8"

## (undated) DEVICE — WARMING BLANKET UPPER ADULT

## (undated) DEVICE — BALLOON ENDOCAVITY 2X14CM

## (undated) DEVICE — VENODYNE/SCD SLEEVE CALF MEDIUM